# Patient Record
Sex: FEMALE | Race: WHITE | Employment: FULL TIME | URBAN - METROPOLITAN AREA
[De-identification: names, ages, dates, MRNs, and addresses within clinical notes are randomized per-mention and may not be internally consistent; named-entity substitution may affect disease eponyms.]

---

## 2017-07-13 ENCOUNTER — OFFICE VISIT (OUTPATIENT)
Dept: FAMILY MEDICINE | Facility: CLINIC | Age: 27
End: 2017-07-13
Payer: COMMERCIAL

## 2017-07-13 VITALS
HEIGHT: 64 IN | RESPIRATION RATE: 16 BRPM | DIASTOLIC BLOOD PRESSURE: 66 MMHG | SYSTOLIC BLOOD PRESSURE: 98 MMHG | HEART RATE: 84 BPM | WEIGHT: 116 LBS | BODY MASS INDEX: 19.81 KG/M2 | TEMPERATURE: 98.5 F | OXYGEN SATURATION: 99 %

## 2017-07-13 DIAGNOSIS — N76.0 BACTERIAL VAGINOSIS: Primary | ICD-10-CM

## 2017-07-13 DIAGNOSIS — B96.89 BACTERIAL VAGINOSIS: Primary | ICD-10-CM

## 2017-07-13 DIAGNOSIS — N89.8 VAGINAL DISCHARGE: ICD-10-CM

## 2017-07-13 DIAGNOSIS — B37.31 VAGINAL YEAST INFECTION: ICD-10-CM

## 2017-07-13 LAB
MICRO REPORT STATUS: ABNORMAL
SPECIMEN SOURCE: ABNORMAL
WET PREP SPEC: ABNORMAL

## 2017-07-13 PROCEDURE — 99203 OFFICE O/P NEW LOW 30 MIN: CPT | Performed by: PHYSICIAN ASSISTANT

## 2017-07-13 PROCEDURE — 87210 SMEAR WET MOUNT SALINE/INK: CPT | Performed by: PHYSICIAN ASSISTANT

## 2017-07-13 RX ORDER — METRONIDAZOLE 500 MG/1
500 TABLET ORAL 2 TIMES DAILY
Qty: 14 TABLET | Refills: 0 | Status: SHIPPED | OUTPATIENT
Start: 2017-07-13 | End: 2018-01-22

## 2017-07-13 RX ORDER — FLUCONAZOLE 150 MG/1
150 TABLET ORAL ONCE
Qty: 1 TABLET | Refills: 1 | Status: SHIPPED | OUTPATIENT
Start: 2017-07-13 | End: 2017-07-13

## 2017-07-13 NOTE — MR AVS SNAPSHOT
"              After Visit Summary   2017    Rachel Rhoades    MRN: [id number]           Patient Information     Date Of Birth          1990        Visit Information        Provider Department      2017 9:50 AM Siegler, Nicole Joy, PA-C [facility]        Today's Diagnoses     Bacterial vaginosis    -  1    Vaginal yeast infection        Vaginal discharge           Follow-ups after your visit        Who to contact     If you have questions or need follow up information about today's clinic visit or your schedule please contact [facility] directly at [phone].  Normal or non-critical lab and imaging results will be communicated to you by Kidamomhart, letter or phone within 4 business days after the clinic has received the results. If you do not hear from us within 7 days, please contact the clinic through Kidamomhart or phone. If you have a critical or abnormal lab result, we will notify you by phone as soon as possible.  Submit refill requests through Simphatic or call your pharmacy and they will forward the refill request to us. Please allow 3 business days for your refill to be completed.          Additional Information About Your Visit        MyChart Information     Simphatic lets you send messages to your doctor, view your test results, renew your prescriptions, schedule appointments and more. To sign up, go to [email or web address] . Click on \"Log in\" on the left side of the screen, which will take you to the Welcome page. Then click on \"Sign up Now\" on the right side of the page.     You will be asked to enter the access code listed below, as well as some personal information. Please follow the directions to create your username and password.     Your access code is: [email or web address]  Expires: 10/11/2017 10:31 AM     Your access code will  in 90 days. If you need help or a new code, please call your [facility] or [phone].      " "  Care EveryWhere ID     This is your Care EveryWhere ID. This could be used by other organizations to access your [place] medical records  [id number]        Your Vitals Were     Pulse Temperature Respirations Height Last Period Pulse Oximetry    84 98.5  F (36.9  C) (Tympanic) 16 5' 4\" (1.626 m) 06/24/2017 99%    Breastfeeding? BMI (Body Mass Index)                No 19.91 kg/m2           Blood Pressure from Last 3 Encounters:   07/13/17 98/66   08/03/15 111/87   03/19/14 106/68    Weight from Last 3 Encounters:   07/13/17 116 lb (52.6 kg)   08/03/15 121 lb 4.1 oz (55 kg)   03/19/14 119 lb 0.8 oz (54 kg)              We Performed the Following     Wet prep          Today's Medication Changes          These changes are accurate as of: 7/13/17 10:31 AM.  If you have any questions, ask your nurse or doctor.               Start taking these medicines.        Dose/Directions    fluconazole 150 MG tablet   Commonly known as:  DIFLUCAN   Used for:  Vaginal yeast infection   Started by:  Siegler, Nicole Joy, PA-C        Dose:  150 mg   Take 1 tablet (150 mg) by mouth once for 1 dose Refill after 3 days if not improved   Quantity:  1 tablet   Refills:  1       metroNIDAZOLE 500 MG tablet   Commonly known as:  FLAGYL   Used for:  Bacterial vaginosis   Started by:  Siegler, Nicole Joy, PA-C        Dose:  500 mg   Take 1 tablet (500 mg) by mouth 2 times daily   Quantity:  14 tablet   Refills:  0            Where to get your medicines      These medications were sent to [facility] [address] [place] [address] [address]  [address] [facility] [place] [address]     Phone:  [phone]     fluconazole 150 MG tablet    metroNIDAZOLE 500 MG tablet                Primary Care Provider    None       No address on file        Equal Access to Services     BRIAN LEDESMA AH: Eveline vergara Sonaomi, waaxda luqadaha, qaybta kaalmada adeegyada, silviano wolf. So [facility] " [phone].    ATENCIÓN: Si chang santos, tiene a yañez disposición servicios gratuitos de asistencia lingüística. Jodi medrano [phone].    We comply with applicable federal civil rights laws and [place] laws. We do not discriminate on the basis of race, color, national origin, age, disability sex, sexual orientation or gender identity.            Thank you!     Thank you for choosing [facility]  for your care. Our goal is always to provide you with excellent care. Hearing back from our patients is one way we can continue to improve our services. Please take a few minutes to complete the written survey that you may receive in the mail after your visit with us. Thank you!             Your Updated Medication List - Protect others around you: Learn how to safely use, store and throw away your medicines at www.disposemymeds.org.          This list is accurate as of: 7/13/17 10:31 AM.  Always use your most recent med list.                   Brand Name Dispense Instructions for use Diagnosis    albuterol 108 (90 BASE) MCG/ACT Inhaler    PROAIR HFA/PROVENTIL HFA/VENTOLIN HFA    1 Inhaler    Inhale 2 puffs into the lungs every 4 hours as needed for shortness of breath / dyspnea. May use 2 puffs 10 minutes before exercise.    Mild persistent asthma       fluconazole 150 MG tablet    DIFLUCAN    1 tablet    Take 1 tablet (150 mg) by mouth once for 1 dose Refill after 3 days if not improved    Vaginal yeast infection       metroNIDAZOLE 500 MG tablet    FLAGYL    14 tablet    Take 1 tablet (500 mg) by mouth 2 times daily    Bacterial vaginosis       norgestimate-ethinyl estradiol 0.25-35 MG-MCG per tablet    ORTHO-CYCLEN (28)    3 Package    Take 1 tablet by mouth daily

## 2017-07-13 NOTE — NURSING NOTE
"Chief Complaint   Patient presents with     Vaginal Problem     Itching, burning, odor, painful intercourse       Initial BP 98/66 (BP Location: Left arm, Patient Position: Sitting, Cuff Size: Adult Regular)  Pulse 84  Temp 98.5  F (36.9  C) (Tympanic)  Resp 16  Ht 5' 4\" (1.626 m)  Wt 116 lb (52.6 kg)  LMP 06/24/2017  SpO2 99%  Breastfeeding? No  BMI 19.91 kg/m2 Estimated body mass index is 19.91 kg/(m^2) as calculated from the following:    Height as of this encounter: 5' 4\" (1.626 m).    Weight as of this encounter: 116 lb (52.6 kg).  Medication Reconciliation: complete     Mindy Orozco LPN  "

## 2017-07-13 NOTE — PROGRESS NOTES
SUBJECTIVE:                                                    Rachel Rhoades is a 26 year old female who presents to clinic today for the following health issues:    Vaginal Symptoms      Duration: Started on Sat.    Description  vaginal discharge - white, itching, burning, odor and pain with intercourse    Intensity:  moderate    Accompanying signs and symptoms (fever/dysuria/abdominal or back pain): None    History  Sexually active: yes, single partner, contraception - oral contraceptives (combined)  Possibility of pregnancy: No  Recent antibiotic use: no     Precipitating or alleviating factors: None    Therapies tried and outcome: none   Outcome:       Pt notes pain/irritation of the vaginal canal with sex a few days ago and symptoms above over the past few days. She was recently on the [facility] and was showering a bit less, she had one bath last week. She has hx of vaginal yeast infection with similar symptoms. She is sexually active with one male partner, they are monogamous. She has not had exposure to STD recently.     Problem list and histories reviewed & adjusted, as indicated.  Additional history: as documented    Patient Active Problem List   Diagnosis     Heart valve problem     Ovarian cystic mass     Mild intermittent asthma     Anomalous atrioventricular excitation     Past Surgical History:   Procedure Laterality Date     C ABLATION SUPRAVENT ARRHYTHMOGENIC FOCUS  8/2007    radiofrequency cardiac ablation for WPW       Social History   Substance Use Topics     Smoking status: Never Smoker     Smokeless tobacco: Never Used     Alcohol use No     Family History   Problem Relation Age of Onset     Hypertension Mother      Asthma Sister      sports induced     Hypertension Maternal Aunt      Hypertension Maternal Uncle      CEREBROVASCULAR DISEASE Maternal Grandfather      Breast Cancer Paternal Grandmother      HEART DISEASE Paternal Grandmother      Allergies Sister      seasonal         Current  "Outpatient Prescriptions   Medication Sig Dispense Refill     metroNIDAZOLE (FLAGYL) 500 MG tablet Take 1 tablet (500 mg) by mouth 2 times daily 14 tablet 0     fluconazole (DIFLUCAN) 150 MG tablet Take 1 tablet (150 mg) by mouth once for 1 dose Refill after 3 days if not improved 1 tablet 1     norgestimate-ethinyl estradiol (ORTHO-CYCLEN, 28,) 0.25-35 MG-MCG tablet Take 1 tablet by mouth daily 3 Package 1     albuterol (PROAIR HFA, PROVENTIL HFA, VENTOLIN HFA) 108 (90 BASE) MCG/ACT inhaler Inhale 2 puffs into the lungs every 4 hours as needed for shortness of breath / dyspnea. May use 2 puffs 10 minutes before exercise. 1 Inhaler 5       Reviewed and updated as needed this visit by clinical staff  Tobacco  Allergies  Meds  Med Hx  Surg Hx  Fam Hx  Soc Hx      Reviewed and updated as needed this visit by Provider         ROS:  Patient denies fever, chills, nausea, vomiting, diarrhea, abdominal pain, dysuria, hematuria, pelvic pain, flank pain, headache, dizziness, lightheadedness.      OBJECTIVE:     BP 98/66 (BP Location: Left arm, Patient Position: Sitting, Cuff Size: Adult Regular)  Pulse 84  Temp 98.5  F (36.9  C) (Tympanic)  Resp 16  Ht 5' 4\" (1.626 m)  Wt 116 lb (52.6 kg)  LMP 06/24/2017  SpO2 99%  Breastfeeding? No  BMI 19.91 kg/m2  Body mass index is 19.91 kg/(m^2).  GENERAL: healthy, alert and no distress   (female): normal female external genitalia, normal urethral meatus, vaginal mucosa is erythematous, normal cervix/adnexa/uterus without masses. White thick discharge.   SKIN: no suspicious lesions or rashes, no ecchymosis or redness  BACK: no CVA tenderness, no paralumbar tenderness    Diagnostic Test Results:  Results for orders placed or performed in visit on 07/13/17 (from the past 24 hour(s))   Wet prep   Result Value Ref Range    Specimen Description Vagina     Wet Prep Clue cells seen  Yeast seen  No Trichomonas seen   (A)     Micro Report Status FINAL 07/13/2017  "       ASSESSMENT/PLAN:         ICD-10-CM    1. Bacterial vaginosis N76.0 metroNIDAZOLE (FLAGYL) 500 MG tablet    B96.89    2. Vaginal yeast infection B37.3 fluconazole (DIFLUCAN) 150 MG tablet   3. Vaginal discharge N89.8 Wet prep     Discussed home care with the patient; she is familiar with both issues and has been treated for both in the past. She was recommended to use the metronidazole first and then use the diflucan to reduce potential for recurrent vaginal yeast infection. We discussed potential side effects of medication use and noted specifically no alcohol with flagyl. No intercourse or baths for duration of therapy and monitor for worsening symptoms.     Nicole Joy Siegler, PA-C  [facility]

## 2018-01-22 ENCOUNTER — OFFICE VISIT (OUTPATIENT)
Dept: FAMILY MEDICINE | Facility: CLINIC | Age: 28
End: 2018-01-22
Payer: COMMERCIAL

## 2018-01-22 ENCOUNTER — TELEPHONE (OUTPATIENT)
Dept: FAMILY MEDICINE | Facility: CLINIC | Age: 28
End: 2018-01-22

## 2018-01-22 VITALS
SYSTOLIC BLOOD PRESSURE: 129 MMHG | WEIGHT: 118.3 LBS | HEIGHT: 64 IN | DIASTOLIC BLOOD PRESSURE: 87 MMHG | HEART RATE: 144 BPM | BODY MASS INDEX: 20.2 KG/M2 | OXYGEN SATURATION: 96 % | TEMPERATURE: 101.6 F

## 2018-01-22 DIAGNOSIS — J11.1 FLU SYNDROME: Primary | ICD-10-CM

## 2018-01-22 DIAGNOSIS — J45.21 MILD INTERMITTENT ASTHMA WITH EXACERBATION: ICD-10-CM

## 2018-01-22 LAB
FLUAV+FLUBV AG SPEC QL: NEGATIVE
FLUAV+FLUBV AG SPEC QL: POSITIVE
SPECIMEN SOURCE: ABNORMAL

## 2018-01-22 PROCEDURE — 87804 INFLUENZA ASSAY W/OPTIC: CPT | Performed by: FAMILY MEDICINE

## 2018-01-22 PROCEDURE — 99214 OFFICE O/P EST MOD 30 MIN: CPT | Performed by: FAMILY MEDICINE

## 2018-01-22 RX ORDER — OSELTAMIVIR PHOSPHATE 75 MG/1
75 CAPSULE ORAL 2 TIMES DAILY
Qty: 10 CAPSULE | Refills: 0 | Status: SHIPPED | OUTPATIENT
Start: 2018-01-22 | End: 2018-03-01

## 2018-01-22 RX ORDER — PREDNISONE 20 MG/1
20 TABLET ORAL DAILY
Qty: 5 TABLET | Refills: 0 | Status: SHIPPED | OUTPATIENT
Start: 2018-01-22 | End: 2018-01-27

## 2018-01-22 NOTE — MR AVS SNAPSHOT
"              After Visit Summary   1/22/2018    Rachel Rhoades    MRN: [id number]           Patient Information     Date Of Birth          1990        Visit Information        Provider Department      1/22/2018 12:30 PM Radha Celestin MD [facility]        Today's Diagnoses     Flu syndrome    -  1    Mild intermittent asthma with exacerbation          Care Instructions    Since within the window 48-72 hours, may benefit from  Antiviral    oseltamivir (TAMIFLU) 75      MG capsule twice daily for 5 days  Stay hydrated, rest as needed   Avoid travel- had flight to [place] tomorrow- supporting letter to cancel the trip is given  Avoid dehydration  Follow up as needed              Asthma, mild intermittent  Plan: albuterol four times daily as needed    If more wheezing- prednisone may help 20 mg once daily  Meanwhile keep taking albuterol inhaler four times daily as needed            Follow-ups after your visit        Who to contact     If you have questions or need follow up information about today's clinic visit or your schedule please contact [facility] directly at [phone].  Normal or non-critical lab and imaging results will be communicated to you by Playnomicshart, letter or phone within 4 business days after the clinic has received the results. If you do not hear from us within 7 days, please contact the clinic through [employer] or phone. If you have a critical or abnormal lab result, we will notify you by phone as soon as possible.  Submit refill requests through [employer] or call your pharmacy and they will forward the refill request to us. Please allow 3 business days for your refill to be completed.          Additional Information About Your Visit        Playnomicshart Information     [employer] lets you send messages to your doctor, view your test results, renew your prescriptions, schedule appointments and more. To sign up, go to [email or web address] . Click on \"Log in\" on the left side of " "the screen, which will take you to the Welcome page. Then click on \"Sign up Now\" on the right side of the page.     You will be asked to enter the access code listed below, as well as some personal information. Please follow the directions to create your username and password.     Your access code is: [email or web address]  Expires: 2018  1:24 PM     Your access code will  in 90 days. If you need help or a new code, please call your [facility] or [phone].        Care EveryWhere ID     This is your Care EveryWhere ID. This could be used by other organizations to access your [place] medical records  [id number]        Your Vitals Were     Pulse Temperature Height Last Period Pulse Oximetry Breastfeeding?    144 101.6  F (38.7  C) (Tympanic) 5' 4\" (1.626 m) 2018 (Approximate) 96% No    BMI (Body Mass Index)                   20.31 kg/m2            Blood Pressure from Last 3 Encounters:   18 129/87   17 98/66   08/03/15 111/87    Weight from Last 3 Encounters:   18 118 lb 4.8 oz (53.7 kg)   17 116 lb (52.6 kg)   08/03/15 121 lb 4.1 oz (55 kg)              We Performed the Following     Influenza A/B antigen          Today's Medication Changes          These changes are accurate as of: 18  1:24 PM.  If you have any questions, ask your nurse or doctor.               Start taking these medicines.        Dose/Directions    oseltamivir 75 MG capsule   Commonly known as:  TAMIFLU   Used for:  Flu syndrome   Started by:  Radha Celestin MD        Dose:  75 mg   Take 1 capsule (75 mg) by mouth 2 times daily   Quantity:  10 capsule   Refills:  0       predniSONE 20 MG tablet   Commonly known as:  DELTASONE   Used for:  Mild intermittent asthma with exacerbation   Started by:  Radha Celestin MD        Dose:  20 mg   Take 1 tablet (20 mg) by mouth daily for 5 days   Quantity:  5 tablet   Refills:  0            Where to get your medicines      These medications were sent to " [facility] - [place], [place] - [address] AT [facility] [address]  [address], [facility] [address]     Phone:  [phone]     oseltamivir 75 MG capsule    predniSONE 20 MG tablet                Primary Care Provider    None Specified       No primary provider on file.        Equal Access to Services     BRIAN LEDESMA : Hadii aad ku hadasho Soomaali, waaxda luqadaha, qaybta kaalmada adeegyada, waxay idiin hayaan adeeg khjose luisjameson holland . So [facility] [phone].    ATENCIÓN: Si habla español, tiene a yañez disposición servicios gratuitos de asistencia lingüística. [facility] [phone].    We comply with applicable federal civil rights laws and [place] laws. We do not discriminate on the basis of race, color, national origin, age, disability, sex, sexual orientation, or gender identity.            Thank you!     Thank you for choosing [facility]  for your care. Our goal is always to provide you with excellent care. Hearing back from our patients is one way we can continue to improve our services. Please take a few minutes to complete the written survey that you may receive in the mail after your visit with us. Thank you!             Your Updated Medication List - Protect others around you: Learn how to safely use, store and throw away your medicines at www.disposemymeds.org.          This list is accurate as of: 1/22/18  1:24 PM.  Always use your most recent med list.                   Brand Name Dispense Instructions for use Diagnosis    albuterol 108 (90 BASE) MCG/ACT Inhaler    PROAIR HFA/PROVENTIL HFA/VENTOLIN HFA    1 Inhaler    Inhale 2 puffs into the lungs every 4 hours as needed for shortness of breath / dyspnea. May use 2 puffs 10 minutes before exercise.    Mild persistent asthma       norgestimate-ethinyl estradiol 0.25-35 MG-MCG per tablet    ORTHO-CYCLEN (28)    3 Package    Take 1 tablet by mouth daily        oseltamivir 75 MG capsule    TAMIFLU    10 capsule    Take  1 capsule (75 mg) by mouth 2 times daily    Flu syndrome       predniSONE 20 MG tablet    DELTASONE    5 tablet    Take 1 tablet (20 mg) by mouth daily for 5 days    Mild intermittent asthma with exacerbation

## 2018-01-22 NOTE — TELEPHONE ENCOUNTER
Reason for call:  Patient reporting a symptom    Symptom or request: Chills, chest congestion, raspy voice, Feels warm, body aches, headache, pressure in chest    Duration (how long have symptoms been present): 2 days    Have you been treated for this before? No    Additional comments: Pt does not have a PCP    Phone Number patient can be reached at:  Home number on file [phone] (home)    Best Time:  anytime    Can we leave a detailed message on this number:  YES    Call taken on 1/22/2018 at 10:30 AM by Leela Herrera

## 2018-01-22 NOTE — PROGRESS NOTES
SUBJECTIVE:   Rachel Rhoades is a 27 year old female who presents to clinic today for the following health issues:  Reports sever onset of bodyaches, fever, coughing within past 2 days  History of asthma- and nw wheeizng some  Cough is dry, some [place], [place].  No flu vaccine this year  Appetite is decreased   Trying to stay hydrated  No nausea,vomiting,diarrhea   Has to fly tomorrow for work  RESPIRATORY SYMPTOMS      Duration: 2 days    Description  Chest congestion, chills, fatigue/malaise and hoarse voice    Severity: moderate to severe     Accompanying signs and symptoms: dry cough,chest pressure and pt states she feels very hot to touch- per boyfriend and some wheezing at times.    History (predisposing factors):  asthma    Therapies tried and outcome:  Robitussin DM chest congestion -with some relief        PROBLEMS TO ADD ON...    Problem list and histories reviewed & adjusted, as indicated.  Additional history: as documented    Patient Active Problem List   Diagnosis     Heart valve problem     Ovarian cystic mass     Mild intermittent asthma     Anomalous atrioventricular excitation     Past Surgical History:   Procedure Laterality Date     C ABLATION SUPRAVENT ARRHYTHMOGENIC FOCUS  8/2007    radiofrequency cardiac ablation for WPW       Social History   Substance Use Topics     Smoking status: Never Smoker     Smokeless tobacco: Never Used     Alcohol use No     Family History   Problem Relation Age of Onset     Hypertension Mother      Asthma Sister      sports induced     Hypertension Maternal Aunt      Hypertension Maternal Uncle      CEREBROVASCULAR DISEASE Maternal Grandfather      Breast Cancer Paternal Grandmother      HEART DISEASE Paternal Grandmother      Allergies Sister      seasonal             Reviewed and updated as needed this visit by clinical staff     Reviewed and updated as needed this visit by Provider         ROS:  CONSTITUTIONAL:as in History of presenting illness   I: NEGATIVE  "for worrisome rashes, moles or lesions  E: NEGATIVE for vision changes or irritation  ENT/MOUTH: as in History of presenting illness   RESP:coughing dry  CV: NEGATIVE for chest pain, palpitations or peripheral edema  GI: NEGATIVE for nausea, abdominal pain, heartburn, or change in bowel habits  MUSCULOSKELETAL:generalzied boyaches  N: NEGATIVE for weakness, dizziness or paresthesias  E: NEGATIVE for temperature intolerance, skin/hair changes  H: NEGATIVE for bleeding problems  P: NEGATIVE for changes in mood or affect    OBJECTIVE:     /87  Pulse 144  Temp 101.6  F (38.7  C) (Tympanic)  Ht 5' 4\" (1.626 m)  Wt 118 lb 4.8 oz (53.7 kg)  LMP 01/16/2018 (Approximate)  SpO2 96%  Breastfeeding? No  BMI 20.31 kg/m2  Body mass index is 20.31 kg/(m^2).  GENERAL: healthy, alert and no distress  HENT: ear canals and TM's normal, nose and mouth without ulcers or lesions  NECK: no adenopathy, no asymmetry, masses, or scars and thyroid normal to palpation  RESP: lungs clear to auscultation - no rales, rhonchi or wheezes  CV: regular rate and rhythm, normal S1 S2, no S3 or S4, no murmur, click or rub, no peripheral edema and peripheral pulses strong  ABDOMEN: soft, nontender, no hepatosplenomegaly, no masses and bowel sounds normal  NEURO: Normal strength and tone, mentation intact and speech normal    Diagnostic Test Results:  Results for orders placed or performed in visit on 01/22/18 (from the past 24 hour(s))   Influenza A/B antigen   Result Value Ref Range    Influenza A/B Agn Specimen Nasal     Influenza A Negative NEG^Negative    Influenza B Positive (A) NEG^Negative       ASSESSMENT/PLAN:     (J11.1) Flu syndrome  (primary encounter diagnosis)  Plan: Influenza A/B antigen,pos for B  Since within the window 48-72 hours, may benefit from  Antiviral    oseltamivir (TAMIFLU) 75      MG capsule twice daily for 5 days  Stay hydrated, rest as needed   Avoid travel- had flight to [place] tomorrow- supporting letter to " cancel the trip is given  Avoid dehydration  Follow up as needed              Asthma, mild intermittent  Plan: albuterol four times daily as needed    If more wheezing- prednisone may help 20 mg once daily  Meanwhile keep taking albuterol inhaler four times daily as needed            Potential medication side effects were discussed with the patient; let me know if any occur.  The patient indicates understanding of these issues and agrees with the plan.            Radha Celestin MD  [facility]

## 2018-01-22 NOTE — PATIENT INSTRUCTIONS
Since within the window 48-72 hours, may benefit from  Antiviral    oseltamivir (TAMIFLU) 75      MG capsule twice daily for 5 days  Stay hydrated, rest as needed   Avoid travel- had flight to [place] tomorrow- supporting letter to cancel the trip is given  Avoid dehydration  Follow up as needed              Asthma, mild intermittent  Plan: albuterol four times daily as needed    If more wheezing- prednisone may help 20 mg once daily  Meanwhile keep taking albuterol inhaler four times daily as needed

## 2018-01-22 NOTE — TELEPHONE ENCOUNTER
Patient not seen here at [facility] before  Called to see if she should come in or do care at home for flu like symptoms  For past two days has had chills, chest congestion, raspy voice, body aches, headache, and chest pressure  States she feels warm - boyfriend felt her forehead and it was very hot she said  Patient does not have thermometer at home though  Has a dry cough  Taking Robitussin DM Chest Congestion - this helps some  States she has wheezing at times with her breathing - asthma on problem list  Has not had to use Albuterol yet for breathing  Patient has heart issues on chart as well  She does not sound good on the phone - sounds very sick   Advised appt - asked that she put a mask on as soon as she comes in    Next 5 appointments (look out 90 days)     Jan 22, 2018 12:30 PM CST   Office Visit with Radha Celestin MD   [facility] ([facility])    [address]  [facility] [id number] [phone]                Jo Ann SERVIN RN

## 2018-01-22 NOTE — LETTER
Regarding: Travel tomorrow by air    For Rachel Rhoades   1990      Rachel Rhoades is sen in the clinic today and is advised to avoid travel tomorrow   Kindly assist her in flight cancellations accordingly    Thank you    Radha Celestin ....................  2018   1:22 PM

## 2018-03-01 ENCOUNTER — OFFICE VISIT (OUTPATIENT)
Dept: FAMILY MEDICINE | Facility: CLINIC | Age: 28
End: 2018-03-01
Payer: COMMERCIAL

## 2018-03-01 VITALS
WEIGHT: 121 LBS | BODY MASS INDEX: 20.66 KG/M2 | TEMPERATURE: 99.2 F | HEIGHT: 64 IN | RESPIRATION RATE: 16 BRPM | DIASTOLIC BLOOD PRESSURE: 72 MMHG | SYSTOLIC BLOOD PRESSURE: 108 MMHG | OXYGEN SATURATION: 99 % | HEART RATE: 85 BPM

## 2018-03-01 DIAGNOSIS — B96.89 BACTERIAL VAGINAL INFECTION: Primary | ICD-10-CM

## 2018-03-01 DIAGNOSIS — Z12.4 CERVICAL CANCER SCREENING: ICD-10-CM

## 2018-03-01 DIAGNOSIS — Z30.41 ENCOUNTER FOR SURVEILLANCE OF CONTRACEPTIVE PILLS: ICD-10-CM

## 2018-03-01 DIAGNOSIS — N89.8 VAGINAL DISCHARGE: ICD-10-CM

## 2018-03-01 DIAGNOSIS — B37.31 YEAST INFECTION OF THE VAGINA: ICD-10-CM

## 2018-03-01 DIAGNOSIS — N76.0 BACTERIAL VAGINAL INFECTION: Primary | ICD-10-CM

## 2018-03-01 DIAGNOSIS — J45.30 MILD PERSISTENT ASTHMA, UNSPECIFIED WHETHER COMPLICATED: ICD-10-CM

## 2018-03-01 LAB
Lab: ABNORMAL
SPECIMEN SOURCE: ABNORMAL
WET PREP SPEC: ABNORMAL

## 2018-03-01 PROCEDURE — G0145 SCR C/V CYTO,THINLAYER,RESCR: HCPCS | Performed by: FAMILY MEDICINE

## 2018-03-01 PROCEDURE — 99214 OFFICE O/P EST MOD 30 MIN: CPT | Performed by: FAMILY MEDICINE

## 2018-03-01 PROCEDURE — 87210 SMEAR WET MOUNT SALINE/INK: CPT | Performed by: FAMILY MEDICINE

## 2018-03-01 PROCEDURE — 87491 CHLMYD TRACH DNA AMP PROBE: CPT | Performed by: FAMILY MEDICINE

## 2018-03-01 PROCEDURE — 87591 N.GONORRHOEAE DNA AMP PROB: CPT | Performed by: FAMILY MEDICINE

## 2018-03-01 RX ORDER — METRONIDAZOLE 500 MG/1
500 TABLET ORAL 2 TIMES DAILY
Qty: 14 TABLET | Refills: 0 | Status: SHIPPED | OUTPATIENT
Start: 2018-03-01 | End: 2018-06-18

## 2018-03-01 RX ORDER — FLUCONAZOLE 150 MG/1
TABLET ORAL
Qty: 4 TABLET | Refills: 0 | Status: SHIPPED | OUTPATIENT
Start: 2018-03-01 | End: 2018-06-18

## 2018-03-01 RX ORDER — ALBUTEROL SULFATE 90 UG/1
2 AEROSOL, METERED RESPIRATORY (INHALATION) EVERY 4 HOURS PRN
Qty: 1 INHALER | Refills: 5 | Status: SHIPPED | OUTPATIENT
Start: 2018-03-01 | End: 2019-03-26

## 2018-03-01 RX ORDER — NORGESTIMATE AND ETHINYL ESTRADIOL 0.25-0.035
1 KIT ORAL DAILY
Qty: 28 TABLET | Refills: 3 | Status: SHIPPED | OUTPATIENT
Start: 2018-03-01 | End: 2019-03-27

## 2018-03-01 ASSESSMENT — PAIN SCALES - GENERAL: PAINLEVEL: NO PAIN (0)

## 2018-03-01 NOTE — MR AVS SNAPSHOT
After Visit Summary   3/1/2018    Rachel Rhoades    MRN: [id number]           Patient Information     Date Of Birth          1990        Visit Information        Provider Department      3/1/2018 11:30 AM Suly Montalvo DO [facility]        Today's Diagnoses     Vaginal discharge    -  1    Cervical cancer screening          Care Instructions      Preventing Vaginitis     Use mild, unscented soap when you bathe or shower to avoid irritating your vagina.    Vaginitis is irritation or infection of the vagina or vulva (the outside opening of the vagina). Vaginitis can be caused by bacteria, viruses, parasites, or yeast. Chemicals (such as in perfumes or soaps or in spermicides) can sometimes be a cause. Vaginitis can be caused by hormone changes in pregnancy or with menopause. You can help prevent vaginitis. Follow the tips below. And see your healthcare provider if you have any symptoms.  Hygiene    Avoid chemicals. Do not use vaginal sprays. Do not use scented toilet paper or tampons that are scented. Sprays and scents have chemicals that can irritate your vagina.    Do not douche unless you are told to by your healthcare provider. Douching is rarely needed. And it upsets the normal balance in the vagina.    Wash yourself well. Wash the outer vaginal area (vulva) every day with mild, unscented soap. Keep it as dry as possible.    Wipe correctly. Make sure to wipe from front to back after a bowel movement. This helps keep from spreading bacteria from your anus to your vagina.    Change your tampon often. During your period, make sure to change your tampon as often as directed on the package. This allows the normal flow of vaginal discharge and blood.  Lifestyle    Limit your number of sexual partners. The more partners you have, the greater your risk of infection. Using condoms helps reduce your risk.    Get enough sleep. Sleep helps keep your body s immune  system healthy. This helps you fight infection.    Lose weight, if needed. Excess weight can reduce air circulation around your vagina. This can increase your risk of infection.    Exercise regularly. Regular activity helps keep your body healthy.    Take antibiotics only as directed. Antibiotics can change the normal chemical balance in the vagina.    Clothing    Don t sit in wet clothes. Yeast thrives when it s warm and damp.    Don t wear tight pants. And don t wear tights, leggings, or hose without a cotton crotch. These types of clothing trap warmth and moisture.    Wear cotton underwear. Cotton lets air circulate around the vagina.  Symptoms of vaginitis    Irritation, swelling, or itching of the genital area    Vaginal discharge    Bad vaginal odor    Pain or burning during urination   Date Last Reviewed: 12/1/2016 2000-2017 [employer]. [address] [place] [address]. All rights reserved. This information is not intended as a substitute for professional medical care. Always follow your healthcare professional's instructions.          Vaginal Infection: Bacterial Vaginosis  Both good and bad bacteria are present in a healthy vagina. Bacterial vaginosis (BV) occurs when these bacteria get out of balance. The numbers of good bacteria decrease. This allows the numbers of bad bacteria to increase and cause BV. In most cases, BV is not a serious problem. This sheet tells you more about BV and how it can be treated.  Causes of Bacterial Vaginosis  The cause of BV is not clear. Douching may lead to it. Having sex with a new partner or more than 1 partner makes it more likely.  Symptoms of Bacterial Vaginosis  Symptoms of BV vary for each woman. Some women have few symptoms or none at all. If symptoms are present, they can include:    Thin, milky white or gray discharge    Unpleasant  fishy  odor    Irritation, itching, and burning at opening of vagina.    Burning or irritation with sex or  urination  Diagnosing Bacterial Vaginosis  Your health care provider will ask about your symptoms and health history. He or she will also perform a pelvic exam. This is an exam of your vagina and cervix. A sample of vaginal fluid or discharge may be taken. This sample is checked for signs of BV.  Treating Bacterial Vaginosis  BV is often treated with antibiotics. They may be given in oral pill form or as a vaginal cream. To use these medications:    Be sure to take all of your medication, even if your symptoms go away.    If you re taking antibiotic pills, do not drink alcohol until you re finished with all of your medication.    If you re using vaginal cream, apply it as directed. Be aware that the cream may make condoms and diaphragms less effective.    Call your health care provider if symptoms do not go away within 4 days of starting treatment. Also call if you have a reaction to the medication.  Why Treatment Matters  Even if you have no symptoms or your symptoms are mild, BV should be treated. Untreated BV can lead to health problems such as:    Increased risk of  delivery if you re pregnant.    Increased risk of complications after surgery on the reproductive organs.    Possible increased risk of pelvic inflammatory disease (PID).     6748-7155 [employer]. [address] [place] [address]. All rights reserved. This information is not intended as a substitute for professional medical care. Always follow your healthcare professional's instructions.  This information has been modified by your health care provider with permission from the publisher.        Vaginal Infection: Yeast (Candidiasis)  Yeast infection occurs when yeast in the vagina increase and attacks the vaginal tissues. Yeast is a type of fungus. These infections are often caused by a type of yeast called Candida albicans. Other species of yeast can also cause infections. Factors that may make infection more likely  include recent antibiotic use, douching, or increased sex. Yeast infections are more common in women who have diabetes, or are obese or pregnant, or have a weak immune system.  Symptoms of yeast infection    Clumpy or thin, white discharge, which may look like cottage cheese    No odor or minimal odor    Severe vaginal itching or burning    Burning with urination    Swelling, redness of vulva    Pain during sex  Treating yeast infection  Yeast infection is treated with a vaginal antifungal cream. In some cases, antifungal pills are prescribed instead. During treatment:    Finish all of your medicine, even if your symptoms go away.    Apply the cream before going to bed. Lie flat after applying so that it doesn't drip out.    Do not douche or use tampons.    Don't rely on a diaphragm or condoms, since the cream may weaken them.    Avoid intercourse if advised by your healthcare provider.     Should I treat a yeast infection myself?  Discuss with your healthcare provider whether you should use over-the-counter medicines to treat a yeast infection. Self-treatment may depend on whether:    You've had a yeast infection in the past.    You're at risk for STDs.  Call your healthcare provider if symptoms do not go away or come back after treatment.   Date Last Reviewed: 3/1/2017    7542-0599 [employer]. [address] [place] [address], [place] [address]. All rights reserved. This information is not intended as a substitute for professional medical care. Always follow your healthcare professional's instructions.                Follow-ups after your visit        Follow-up notes from your care team     Return if symptoms worsen or fail to improve.      Who to contact     If you have questions or need follow up information about today's clinic visit or your schedule please contact [facility] directly at [phone].  Normal or non-critical lab and imaging results will be communicated to you  "by [employer], letter or phone within 4 business days after the clinic has received the results. If you do not hear from us within 7 days, please contact the clinic through [employer] or phone. If you have a critical or abnormal lab result, we will notify you by phone as soon as possible.  Submit refill requests through [employer] or call your pharmacy and they will forward the refill request to us. Please allow 3 business days for your refill to be completed.          Additional Information About Your Visit        [facility] Information     [employer] lets you send messages to your doctor, view your test results, renew your prescriptions, schedule appointments and more. To sign up, go to [email or web address] . Click on \"Log in\" on the left side of the screen, which will take you to the Welcome page. Then click on \"Sign up Now\" on the right side of the page.     You will be asked to enter the access code listed below, as well as some personal information. Please follow the directions to create your username and password.     Your access code is: [email or web address]  Expires: 2018  1:24 PM     Your access code will  in 90 days. If you need help or a new code, please call your [place] clinic or [phone].        Care EveryWhere ID     This is your Care EveryWhere ID. This could be used by other organizations to access your [facility] medical records  [id number]        Your Vitals Were     Pulse Temperature Respirations Height Last Period Pulse Oximetry    85 99.2  F (37.3  C) (Tympanic) 16 5' 4\" (1.626 m) 2018 (Approximate) 99%    Breastfeeding? BMI (Body Mass Index)                No 20.77 kg/m2           Blood Pressure from Last 3 Encounters:   18 108/72   18 129/87   17 98/66    Weight from Last 3 Encounters:   18 121 lb (54.9 kg)   18 118 lb 4.8 oz (53.7 kg)   17 116 lb (52.6 kg)              We Performed the Following     Chlamydia trachomatis PCR     Neisseria gonorrhoeae " PCR     Pap imaged thin layer screen reflex to HPV if ASCUS - recommend age 25 - 29     Wet prep        Primary Care Provider Office Phone # Fax #    [facility] [phone] [address] [facility] [address]        Equal Access to Services     BRIAN LEDESMA : Hadii aad ku hadasho Soomaali, waaxda luqadaha, qaybta kaalmada adeegyada, waxay idiin hayaan adeeg jhoanajose luisjameson wolf. So [facility] [phone].    ATENCIÓN: Si habla español, tiene a yañez disposición servicios gratuitos de asistencia lingüística. Llame al [phone].    We comply with applicable federal civil rights laws and [place] laws. We do not discriminate on the basis of race, color, national origin, age, disability, sex, sexual orientation, or gender identity.            Thank you!     Thank you for choosing [facility]  for your care. Our goal is always to provide you with excellent care. Hearing back from our patients is one way we can continue to improve our services. Please take a few minutes to complete the written survey that you may receive in the mail after your visit with us. Thank you!             Your Updated Medication List - Protect others around you: Learn how to safely use, store and throw away your medicines at www.disposemymeds.org.          This list is accurate as of 3/1/18 11:50 AM.  Always use your most recent med list.                   Brand Name Dispense Instructions for use Diagnosis    albuterol 108 (90 BASE) MCG/ACT Inhaler    PROAIR HFA/PROVENTIL HFA/VENTOLIN HFA    1 Inhaler    Inhale 2 puffs into the lungs every 4 hours as needed for shortness of breath / dyspnea. May use 2 puffs 10 minutes before exercise.    Mild persistent asthma       norgestimate-ethinyl estradiol 0.25-35 MG-MCG per tablet    ORTHO-CYCLEN (28)    3 Package    Take 1 tablet by mouth daily

## 2018-03-01 NOTE — LETTER
March 6, 2018      Rachel PABON Jf  [address]  [facility] [address]    Dear ,      I am happy to inform you that your recent cervical cancer screening test (PAP smear) was normal.      Preventative screenings such as this help to ensure your health for years to come. You should repeat a pap smear in 3 years, unless otherwise directed.      You will still need to return to the clinic every year for your annual exam and other preventive tests.     Please contact the clinic at [phone] if you have further questions.       Sincerely,      Suly Montalvo DO/[facility]

## 2018-03-01 NOTE — NURSING NOTE
"Chief Complaint   Patient presents with     Pain     Painful intercourse     Vaginal Problem     Odor       Initial /72 (BP Location: Right arm, Patient Position: Sitting, Cuff Size: Adult Regular)  Pulse 85  Temp 99.2  F (37.3  C) (Tympanic)  Resp 16  Ht 5' 4\" (1.626 m)  Wt 121 lb (54.9 kg)  LMP 02/13/2018 (Approximate)  SpO2 99%  Breastfeeding? No  BMI 20.77 kg/m2 Estimated body mass index is 20.77 kg/(m^2) as calculated from the following:    Height as of this encounter: 5' 4\" (1.626 m).    Weight as of this encounter: 121 lb (54.9 kg).  Medication Reconciliation: complete     Mindy Orozco LPN  "

## 2018-03-01 NOTE — LETTER
"March 5, 2018      Rachel Rhoades  [address]  [facility] [address]        Dear ,    We are writing to inform you of your test results.    Your STD testing is NEGATIVE which means you do not have Gonorrhea or Chlamydia.    The vaginal discharge test (\"wet prep\") does show that you have bacterial vaginosis and a yeast infection (both which are not STD's).  These vaginal infections should get better with the Flagyl (Metronidazole) and Diflucan I sent you.    It was great to meet you, Rachel.  Hope you feel better soon!         Resulted Orders   Neisseria gonorrhoeae PCR   Result Value Ref Range    Specimen Descrip Cervix     N Gonorrhea PCR Negative NEG^Negative      Comment:      Negative for N. gonorrhoeae rRNA by transcription mediated amplification.  A negative result by transcription mediated amplification does not preclude   the presence of N. gonorrhoeae infection because results are dependent on   proper and adequate collection, absence of inhibitors, and sufficient rRNA to   be detected.     Chlamydia trachomatis PCR   Result Value Ref Range    Specimen Description Cervix     Chlamydia Trachomatis PCR Negative NEG^Negative      Comment:      Negative for C. trachomatis rRNA by transcription mediated amplification.  A negative result by transcription mediated amplification does not preclude   the presence of C. trachomatis infection because results are dependent on   proper and adequate collection, absence of inhibitors, and sufficient rRNA to   be detected.     Wet prep   Result Value Ref Range    Specimen Description Cervix     Special Requests Cervix     Wet Prep Clue cells seen (A)     Wet Prep No Trichomonas seen     Wet Prep Yeast seen (A)        If you have any questions or concerns, please call the clinic at the number listed above.       Sincerely,        Suly Montalvo, DO                "

## 2018-03-01 NOTE — PROGRESS NOTES
SUBJECTIVE:   Rachel Rhoades is a 27 year old female who presents to clinic today for the following health issues:        Vaginal Symptoms      Duration: X2 days    Description  vaginal discharge - creamy and odor    Intensity:  moderate    Accompanying signs and symptoms (fever/dysuria/abdominal or back pain): Painful [place]    History  Sexually active: yes, single partner, contraception - oral contraceptives (combined)  Possibility of pregnancy: No  Recent antibiotic use: no     Precipitating or alleviating factors: None    Therapies tried and outcome: none   Outcome: n/a    No UTI symptoms.  No fevers.   Has hx genital herpes, first outbreak was 2 years ago.  Last outbreak of genital herpes was 6 months ago.  Asthma is well controlled, on albuterol PRN.  Needs refill.    Takes OCP's; Needs OCP refill as well.      Problem list and histories reviewed & adjusted, as indicated.  Additional history: as documented    Labs reviewed in EPIC    Reviewed and updated as needed this visit by clinical staff  Tobacco  Allergies  Meds  Problems  Med Hx  Surg Hx  Fam Hx  Soc Hx        Reviewed and updated as needed this visit by Provider  Allergies  Meds  Problems         ROS:  CONSTITUTIONAL: NEGATIVE for fever, chills, change in weight  INTEGUMENTARY/SKIN: NEGATIVE for worrisome rashes, moles or lesions  EYES: NEGATIVE for vision changes or irritation  ENT/MOUTH: NEGATIVE for ear, mouth and throat problems  RESP: NEGATIVE for significant cough or SOB  CV: NEGATIVE for chest pain, palpitations or peripheral edema  GI: NEGATIVE for nausea, abdominal pain, heartburn, or change in bowel habits  : NEGATIVE for frequency, dysuria, or hematuria  MUSCULOSKELETAL: NEGATIVE for significant arthralgias or myalgia  HEME: NEGATIVE for bleeding problems  PSYCHIATRIC: NEGATIVE for changes in mood or affect    OBJECTIVE:     /72 (BP Location: Right arm, Patient Position: Sitting, Cuff Size: Adult Regular)  Pulse 85  " Temp 99.2  F (37.3  C) (Tympanic)  Resp 16  Ht 5' 4\" (1.626 m)  Wt 121 lb (54.9 kg)  LMP 02/13/2018 (Approximate)  SpO2 99%  Breastfeeding? No  BMI 20.77 kg/m2  Body mass index is 20.77 kg/(m^2).   GENERAL: healthy, alert and no distress  EYES: Eyes grossly normal to inspection, PERRL and conjunctivae and sclerae normal  HENT: ear canals and TM's normal, nose and mouth without ulcers or lesions  NECK: no adenopathy, no asymmetry, masses, or scars and thyroid normal to palpation  RESP: lungs clear to auscultation - no rales, rhonchi or wheezes  CV: regular rate and rhythm, normal S1 S2, no S3 or S4, no murmur, click or rub, no peripheral edema and peripheral pulses strong  ABDOMEN: soft, nontender, no hepatosplenomegaly, no masses and bowel sounds normal   (female): normal female external genitalia, normal urethral meatus , vaginal mucosa pink, moist, well rugated, normal cervix, adnexae, and uterus without masses.  Cervical discharge present in vaginal orifice/cervix: white/thick discharge with fishy odor.  MS: no gross musculoskeletal defects noted, no edema  SKIN: no suspicious lesions or rashes  NEURO: Normal strength and tone, mentation intact and speech normal  PSYCH: mentation appears normal, affect normal/bright    Diagnostic Test Results:  Wet prep: +BV, Yeast.  Gonorrhea/Chlamydia  ASSESSMENT/PLAN:     Problem List Items Addressed This Visit     None      Visit Diagnoses     Bacterial vaginal infection    -  Primary    Relevant Medications    metroNIDAZOLE (FLAGYL) 500 MG tablet    Yeast infection of the vagina        Relevant Medications    albuterol (PROAIR HFA/PROVENTIL HFA/VENTOLIN HFA) 108 (90 BASE) MCG/ACT Inhaler    fluconazole (DIFLUCAN) 150 MG tablet    Vaginal discharge        Relevant Orders    Neisseria gonorrhoeae PCR (Completed)    Chlamydia trachomatis PCR (Completed)    Wet prep (Completed)    Cervical cancer screening        Relevant Orders    Pap imaged thin layer screen reflex " to HPV if ASCUS - recommend age 25 - 29 (Completed)    Encounter for surveillance of contraceptive pills        Relevant Medications    norgestimate-ethinyl estradiol (ORTHO-CYCLEN, 28,) 0.25-35 MG-MCG per tablet    Mild persistent asthma, unspecified whether complicated        Relevant Medications    albuterol (PROAIR HFA/PROVENTIL HFA/VENTOLIN HFA) 108 (90 BASE) MCG/ACT Inhaler         --Wet prep: +BV, yeast.  Rx Flagyl and Fluconazole.  --Due for Pap smear, obtained today  --Requesting refill for asthma med, Rx albuterol  --Requesting refill for contraception.  Normal/regular periods.  Rx Ortho-Cyclen  --Follow-up recommended for yearly preventive exams or sooner for an acute issues.            Suly Montalvo, DO  [facility]

## 2018-03-01 NOTE — LETTER
My Asthma Action Plan  Name: Rachel Rhoades   YOB: 1990  Date: 3/1/2018   My doctor: Suly Montalvo, DO   My clinic: [facility]        My Control Medicine: { :506429}  My Rescue Medicine: { :795799}  {AAP include Oral Steroid:113233} My Asthma Severity: { :213922}  Avoid your asthma triggers: { :203247}        {Is patient a child or adult?:567943}       GREEN ZONE   Good Control    I feel good    No cough or wheeze    Can work, sleep and play without asthma symptoms       Take your asthma control medicine every day.     1. If exercise triggers your asthma, take your rescue medication    15 minutes before exercise or sports, and    During exercise if you have asthma symptoms  2. Spacer to use with inhaler: If you have a spacer, make sure to use it with your inhaler             YELLOW ZONE Getting Worse  I have ANY of these:    I do not feel good    Cough or wheeze    Chest feels tight    Wake up at night   1. Keep taking your Green Zone medications  2. Start taking your rescue medicine:    every 20 minutes for up to 1 hour. Then every 4 hours for 24-48 hours.  3. If you stay in the Yellow Zone for more than 12-24 hours, contact your doctor.  4. If you do not return to the Green Zone in 12-24 hours or you get worse, start taking your oral steroid medicine if prescribed by your provider.           RED ZONE Medical Alert - Get Help  I have ANY of these:    I feel awful    Medicine is not helping    Breathing getting harder    Trouble walking or talking    Nose opens wide to breathe       1. Take your rescue medicine NOW  2. If your provider has prescribed an oral steroid medicine, start taking it NOW  3. Call your doctor NOW  4. If you are still in the Red Zone after 20 minutes and you have not reached your doctor:    Take your rescue medicine again and    Call 911 or go to the emergency room right away    See your regular doctor within 2 weeks of an Emergency Room or  Urgent Care visit for follow-up treatment.        Electronically signed by: Mindy Song, March 1, 2018    Annual Reminders:  Meet with Asthma Educator,  Flu Shot in the Fall, consider Pneumonia Vaccination for patients with asthma (aged 19 and older).    Pharmacy:    [address], [facility] [id number] [place] [address] [address] AT [facility] [address]                    Asthma Triggers  How To Control Things That Make Your Asthma Worse    Triggers are things that make your asthma worse.  Look at the list below to help you find your triggers and what you can do about them.  You can help prevent asthma flare-ups by staying away from your triggers.      Trigger                                                          What you can do   Cigarette Smoke  Tobacco smoke can make asthma worse. Do not allow smoking in your home, car or around you.  Be sure no one smokes at a child s day care or school.  If you smoke, ask your health care provider for ways to help you quit.  Ask family members to quit too.  Ask your health care provider for a referral to Quit Plan to help you quit smoking, or call 7-069-156-PLAN.     Colds, Flu, Bronchitis  These are common triggers of asthma. Wash your hands often.  Don t touch your eyes, nose or mouth.  Get a flu shot every year.     Dust Mites  These are tiny bugs that live in cloth or carpet. They are too small to see. Wash sheets and blankets in hot water every week.   Encase pillows and mattress in dust mite proof covers.  Avoid having carpet if you can. If you have carpet, vacuum weekly.   Use a dust mask and HEPA vacuum.   Pollen and Outdoor Mold  Some people are allergic to trees, grass, or weed pollen, or molds. Try to keep your windows closed.  Limit time out doors when pollen count is high.   Ask you health care provider about taking medicine during allergy season.     Animal Dander  Some people are allergic to skin flakes, urine or saliva  from pets with fur or feathers. Keep pets with fur or feathers out of your home.    If you can t keep the pet outdoors, then keep the pet out of your bedroom.  Keep the bedroom door closed.  Keep pets off cloth furniture and away from stuffed toys.     Mice, Rats, and Cockroaches  Some people are allergic to the waste from these pests.   Cover food and garbage.  Clean up spills and food crumbs.  Store grease in the refrigerator.   Keep food out of the bedroom.   Indoor Mold  This can be a trigger if your home has high moisture. Fix leaking faucets, pipes, or other sources of water.   Clean moldy surfaces.  Dehumidify basement if it is damp and smelly.   Smoke, Strong Odors, and Sprays  These can reduce air quality. Stay away from strong odors and sprays, such as perfume, powder, hair spray, paints, smoke incense, paint, cleaning products, candles and new carpet.   Exercise or Sports  Some people with asthma have this trigger. Be active!  Ask your doctor about taking medicine before sports or exercise to prevent symptoms.    Warm up for 5-10 minutes before and after sports or exercise.     Other Triggers of Asthma  Cold air:  Cover your nose and mouth with a scarf.  Sometimes laughing or crying can be a trigger.  Some medicines and food can trigger asthma.

## 2018-03-01 NOTE — PATIENT INSTRUCTIONS
Preventing Vaginitis     Use mild, unscented soap when you bathe or shower to avoid irritating your vagina.    Vaginitis is irritation or infection of the vagina or vulva (the outside opening of the vagina). Vaginitis can be caused by bacteria, viruses, parasites, or yeast. Chemicals (such as in perfumes or soaps or in spermicides) can sometimes be a cause. Vaginitis can be caused by hormone changes in pregnancy or with menopause. You can help prevent vaginitis. Follow the tips below. And see your healthcare provider if you have any symptoms.  Hygiene    Avoid chemicals. Do not use vaginal sprays. Do not use scented toilet paper or tampons that are scented. Sprays and scents have chemicals that can irritate your vagina.    Do not douche unless you are told to by your healthcare provider. Douching is rarely needed. And it upsets the normal balance in the vagina.    Wash yourself well. Wash the outer vaginal area (vulva) every day with mild, unscented soap. Keep it as dry as possible.    Wipe correctly. Make sure to wipe from front to back after a bowel movement. This helps keep from spreading bacteria from your anus to your vagina.    Change your tampon often. During your period, make sure to change your tampon as often as directed on the package. This allows the normal flow of vaginal discharge and blood.  Lifestyle    Limit your number of sexual partners. The more partners you have, the greater your risk of infection. Using condoms helps reduce your risk.    Get enough sleep. Sleep helps keep your body s immune system healthy. This helps you fight infection.    Lose weight, if needed. Excess weight can reduce air circulation around your vagina. This can increase your risk of infection.    Exercise regularly. Regular activity helps keep your body healthy.    Take antibiotics only as directed. Antibiotics can change the normal chemical balance in the vagina.    Clothing    Don t sit in wet clothes. Yeast thrives  when it s warm and damp.    Don t wear tight pants. And don t wear tights, leggings, or hose without a cotton crotch. These types of clothing trap warmth and moisture.    Wear cotton underwear. Cotton lets air circulate around the vagina.  Symptoms of vaginitis    Irritation, swelling, or itching of the genital area    Vaginal discharge    Bad vaginal odor    Pain or burning during urination   Date Last Reviewed: 12/1/2016 2000-2017 [employer]. [address] [place], [place] [id number]. All rights reserved. This information is not intended as a substitute for professional medical care. Always follow your healthcare professional's instructions.          Vaginal Infection: Bacterial Vaginosis  Both good and bad bacteria are present in a healthy vagina. Bacterial vaginosis (BV) occurs when these bacteria get out of balance. The numbers of good bacteria decrease. This allows the numbers of bad bacteria to increase and cause BV. In most cases, BV is not a serious problem. This sheet tells you more about BV and how it can be treated.  Causes of Bacterial Vaginosis  The cause of BV is not clear. Douching may lead to it. Having sex with a new partner or more than 1 partner makes it more likely.  Symptoms of Bacterial Vaginosis  Symptoms of BV vary for each woman. Some women have few symptoms or none at all. If symptoms are present, they can include:    Thin, milky white or gray discharge    Unpleasant  fishy  odor    Irritation, itching, and burning at opening of vagina.    Burning or irritation with sex or urination  Diagnosing Bacterial Vaginosis  Your health care provider will ask about your symptoms and health history. He or she will also perform a pelvic exam. This is an exam of your vagina and cervix. A sample of vaginal fluid or discharge may be taken. This sample is checked for signs of BV.  Treating Bacterial Vaginosis  BV is often treated with antibiotics. They may be given in oral pill form or  as a vaginal cream. To use these medications:    Be sure to take all of your medication, even if your symptoms go away.    If you re taking antibiotic pills, do not drink alcohol until you re finished with all of your medication.    If you re using vaginal cream, apply it as directed. Be aware that the cream may make condoms and diaphragms less effective.    Call your health care provider if symptoms do not go away within 4 days of starting treatment. Also call if you have a reaction to the medication.  Why Treatment Matters  Even if you have no symptoms or your symptoms are mild, BV should be treated. Untreated BV can lead to health problems such as:    Increased risk of  delivery if you re pregnant.    Increased risk of complications after surgery on the reproductive organs.    Possible increased risk of pelvic inflammatory disease (PID).     3742-9179 [employer]. [address] [place] [address]. All rights reserved. This information is not intended as a substitute for professional medical care. Always follow your healthcare professional's instructions.  This information has been modified by your health care provider with permission from the publisher.        Vaginal Infection: Yeast (Candidiasis)  Yeast infection occurs when yeast in the vagina increase and attacks the vaginal tissues. Yeast is a type of fungus. These infections are often caused by a type of yeast called Candida albicans. Other species of yeast can also cause infections. Factors that may make infection more likely include recent antibiotic use, douching, or increased sex. Yeast infections are more common in women who have diabetes, or are obese or pregnant, or have a weak immune system.  Symptoms of yeast infection    Clumpy or thin, white discharge, which may look like cottage cheese    No odor or minimal odor    Severe vaginal itching or burning    Burning with urination    Swelling, redness of vulva    Pain  during sex  Treating yeast infection  Yeast infection is treated with a vaginal antifungal cream. In some cases, antifungal pills are prescribed instead. During treatment:    Finish all of your medicine, even if your symptoms go away.    Apply the cream before going to bed. Lie flat after applying so that it doesn't drip out.    Do not douche or use tampons.    Don't rely on a diaphragm or condoms, since the cream may weaken them.    Avoid intercourse if advised by your healthcare provider.     Should I treat a yeast infection myself?  Discuss with your healthcare provider whether you should use over-the-counter medicines to treat a yeast infection. Self-treatment may depend on whether:    You've had a yeast infection in the past.    You're at risk for STDs.  Call your healthcare provider if symptoms do not go away or come back after treatment.   Date Last Reviewed: 3/1/2017    5034-6552 [employer] [address] [place] [address], [place] [address]. All rights reserved. This information is not intended as a substitute for professional medical care. Always follow your healthcare professional's instructions.

## 2018-03-02 LAB
C TRACH DNA SPEC QL NAA+PROBE: NEGATIVE
N GONORRHOEA DNA SPEC QL NAA+PROBE: NEGATIVE
SPECIMEN SOURCE: NORMAL
SPECIMEN SOURCE: NORMAL

## 2018-03-02 ASSESSMENT — ASTHMA QUESTIONNAIRES: ACT_TOTALSCORE: 21

## 2018-03-05 LAB
COPATH REPORT: NORMAL
PAP: NORMAL

## 2018-06-06 ENCOUNTER — OFFICE VISIT (OUTPATIENT)
Dept: DERMATOLOGY | Facility: CLINIC | Age: 28
End: 2018-06-06
Payer: COMMERCIAL

## 2018-06-06 VITALS — SYSTOLIC BLOOD PRESSURE: 126 MMHG | DIASTOLIC BLOOD PRESSURE: 79 MMHG | OXYGEN SATURATION: 97 % | HEART RATE: 104 BPM

## 2018-06-06 DIAGNOSIS — L70.0 ACNE VULGARIS: Primary | ICD-10-CM

## 2018-06-06 DIAGNOSIS — L90.5 ACNE SCARRING: ICD-10-CM

## 2018-06-06 DIAGNOSIS — L81.0 POST-INFLAMMATORY HYPERPIGMENTATION: ICD-10-CM

## 2018-06-06 PROCEDURE — 99203 OFFICE O/P NEW LOW 30 MIN: CPT | Performed by: PHYSICIAN ASSISTANT

## 2018-06-06 RX ORDER — AMOXICILLIN 500 MG/1
CAPSULE ORAL
Qty: 60 CAPSULE | Refills: 2 | Status: SHIPPED | OUTPATIENT
Start: 2018-06-06 | End: 2019-03-27

## 2018-06-06 RX ORDER — TRETINOIN 0.25 MG/G
CREAM TOPICAL
Qty: 45 G | Refills: 3 | Status: SHIPPED | OUTPATIENT
Start: 2018-06-06 | End: 2019-03-27

## 2018-06-06 NOTE — MR AVS SNAPSHOT
After Visit Summary   6/6/2018    Rachel Rhoades    MRN: [id number]           Patient Information     Date Of Birth          1990        Visit Information        Provider Department      6/6/2018 3:00 PM Chloe Nolen PA-C [facility]        Today's Diagnoses     Acne vulgaris    -  1    Acne scarring        Post-inflammatory hyperpigmentation          Care Instructions    Acne vulgaris    Morning regimen:    Wash with your clean and clear  Apply a gentle moisturizer*  Take amoxicillin     Evening regimen:    Wash with benzoyl peroxide (Differin makes a 5%)   Apply tretinoin   Apply a gentle moisturizer (do not need sunscreen at night)  Take amoxicillin     *Facial moisturizer (preferably with an SPF of 30 or greater) such as Cetaphil, CeraVe, or Vanicream. Make sure lotion is non-comedogenic. Sunscreen active ingredients: Physical blockers are less likely to clog pores. Examples include titanium dioxide and zinc oxide  Good body moisturizers include Cetaphil, CeraVe, Eucerin, and Vanicream.    Disc Tretinoin at bedtime, dryness, irritation and way to prevent discussed with patient   Benzoyl Peroxide (BPO) wash daily or every other day depending on dryness  (2.5%-5% BPO on face and 5%-10% on chest and back)  Aggressive use of bland emollients discussed with patient   If taking Doxycycline take with food but avoid calcium-rich foods as this can reduce the efficacy of Doxycycline. GI upset, esophagitis, and UV precautions discussed with patient     May take 2-3 months to see 50-70% improvement  May get worse during initial phase of treatment    Pathophysiology discussed with patient and information provided.  I discussed with patient oral versus topical treatments such as oral antibiotics, Spironolactone (Aldactone)(women only),oral contraceptive pills (women only) topical creams,  and over-the-counter treatments.     Acne can be effectively treated, although  response may sometimes be slow.   Where possible, avoid excessively humid conditions such as a sauna, working in an unventilated kitchen or tropical vacations.   If you smoke, stop. Nicotine increases sebum retention and increased scale within the follicles, forming comedones (black and whiteheads).    Minimize the application of oils and cosmetics to the affected skin.  Abrasive skin treatments can aggravate acne.   Try not to scratch or pick the spots as this can increase your risk for permanent scarring in the area.   To avoid sunburn, protect your skin outdoors using a broad spectrum (UVB and UVA) sunscreen and protective clothing.  No relationship between particular foods and acne has been proven. However, reports suggest low glycemic and low dairy diet are helpful for some people.    ACNE INFORMATION     Acne is a skin disease affecting oil glands and hair follicles in your skin.  When these pores do not drain properly, hair follicles can becomes clogged and bacteria can be trapped causing inflammation to occur. Clinical manifestations range from mild to severe, such as comedones (whiteheads and blackheads) or cysts.     Several factors contribute to acne:     1. Hormonal- Androgen (a type of hormone) can cause oil glands to enlarges and produces more sebum (oil).    2. Bacterial- A specific type of bacteria called Propionibacterium acnes are found in these oily follicles and stimulate more inflammation.     3. Genetics- History of family members (parents or siblings)     4. External factors- mechanical trauma, cosmetics, topical steroids or some oral medications.      Combination therapy is the mainstay of treatment given the multiple factors contributing to acne.  The type of treatment is also dependant on whether you are pregnancy or breastfeeding.     TOPICAL TREATMENTS   Topical Antibiotics These medications help prevent growth of bacteria in your pores.   Topical Exfoliating Agent These are drying agents  that will help normalize oil production, unplug pores and reduce bacterial growth. (Note: Make sure you discontinue this medication ONE week prior to waxing or your skin may lift.)     ORAL TREATMENTS   Oral Antibiotics: Tetracyclines are the most commonly used oral antibiotics to treat moderate to severe acne. They are safe to take for months at a time. Some side effects to these medications include: sun sensitivity, stomach upset, dizziness and heartburn. If you experience a sudden onset of rash or severe unusual headache, discontinue the medication and contact your clinician immediately.     Spironolactone: This oral medication blocks androgens (hormones that can aggravate acne).  Since this medication is also used as a diuretic, baseline blood work is needed to check your electrolytes and liver function. Do not get pregnant while on this medication as it can cause birth defects. Make sure to drink plenty of water.    Birth Control Pills (Females only). In order to decrease hormonal fluctuations that affect acne, birth control pills such as Luna  or June  ( and others) can be effective in treating acne.  We advise you to discuss with your OB/GYN or Primary Care Doctor to be screened and to check if you are eligible to be on this pill.     Accutane  (generic: isotretinoin). This oral medication is a retinoid (Vitamin A derivative like Retin-A) used to treat severe acne that does not respond to the above medications. Accutane  can  help clear acne for years and the average period of treatment is five to seven months, however, the duration of treatment may be adjusted based on severity. Some people may need more than one treatment.     ALTERNATIVE TREATMENTS   Microdermabrasion & light chemical peels help improve skin texture, decrease pore size, decrease mild scarring & increase absorption of your topical treatments     Blue Spectrum Light Therapy consists of a concentrated visible light that kills bacteria in the  oil ducts. It is safe for pregnancy & nursing. At times, ALA (a topical solution) may be applied prior to the Blue Light exposure in order to enhance light penetration.     Pulse Dye Laser (PDL) decreases inflammation and improves certain acne scars.     Recommended facial cleansers, moisturizers & cosmetics:   Cetaphil  CeraVe  (Note: Look for  non-comeodogenic  cleansers & moisturizers)   Clinique , Prescriptives  make-up               Follow-ups after your visit        Who to contact     If you have questions or need follow up information about today's clinic visit or your schedule please contact [facility] directly at [phone].  Normal or non-critical lab and imaging results will be communicated to you by MyChart, letter or phone within 4 business days after the clinic has received the results. If you do not hear from us within 7 days, please contact the clinic through MyChart or phone. If you have a critical or abnormal lab result, we will notify you by phone as soon as possible.  Submit refill requests through [employer] or call your pharmacy and they will forward the refill request to us. Please allow 3 business days for your refill to be completed.          Additional Information About Your Visit        Care EveryWhere ID     This is your Care EveryWhere ID. This could be used by other organizations to access your [facility] medical records  [id number]        Your Vitals Were     Pulse Last Period Pulse Oximetry Breastfeeding?          104 05/10/2018 (Exact Date) 97% No         Blood Pressure from Last 3 Encounters:   06/06/18 126/79   03/01/18 108/72   01/22/18 129/87    Weight from Last 3 Encounters:   03/01/18 54.9 kg (121 lb)   01/22/18 53.7 kg (118 lb 4.8 oz)   07/13/17 52.6 kg (116 lb)              Today, you had the following     No orders found for display         Today's Medication Changes          These changes are accurate as of 6/6/18  3:19 PM.  If you have any  questions, ask your nurse or doctor.               Start taking these medicines.        Dose/Directions    amoxicillin 500 MG capsule   Commonly known as:  AMOXIL   Used for:  Acne vulgaris   Started by:  Chloe Nolen PA-C        Take one capsule in the morning and one capsule at night   Quantity:  60 capsule   Refills:  2       tretinoin 0.025 % cream   Commonly known as:  RETIN-A   Used for:  Acne vulgaris, Acne scarring, Post-inflammatory hyperpigmentation   Started by:  Chloe Nolen PA-C        Apply a pea-sized amount to face. Start every 3-4 nights working up to every night.   Quantity:  45 g   Refills:  3            Where to get your medicines      These medications were sent to [facility] [id number] [place] [address] [address] AT [facility] [address]  [address], [facility] [address]     Phone:  [phone]     amoxicillin 500 MG capsule    tretinoin 0.025 % cream                Primary Care Provider Office Phone # Fax #    Suly Montalvo [phone] [address] [facility] [[email or web address]] [address]  [facility] [address]        Equal Access to Services     BRIAN LEDESMA AH: Hadii aad ku hadasho Soomaali, waaxda luqadaha, qaybta kaalmada adeegyada, waxay idiin haychanin elizabeth wolf. So [facility] [phone].    ATENCIÓN: Si habla español, tiene a yañez disposición servicios gratuitos de asistencia lingüística. [facility] [phone].    We comply with applicable federal civil rights laws and [place] laws. We do not discriminate on the basis of race, color, national origin, age, disability, sex, sexual orientation, or gender identity.            Thank you!     Thank you for choosing [facility]  for your care. Our goal is always to provide you with excellent care. Hearing back from our patients is one way we can continue to improve our services. Please take a few minutes to complete the written survey that you may receive in the mail  after your visit with us. Thank you!             Your Updated Medication List - Protect others around you: Learn how to safely use, store and throw away your medicines at www.disposemymeds.org.          This list is accurate as of 6/6/18  3:19 PM.  Always use your most recent med list.                   Brand Name Dispense Instructions for use Diagnosis    albuterol 108 (90 Base) MCG/ACT Inhaler    PROAIR HFA/PROVENTIL HFA/VENTOLIN HFA    1 Inhaler    Inhale 2 puffs into the lungs every 4 hours as needed for shortness of breath / dyspnea May use 2 puffs 10 minutes before exercise.    Mild persistent asthma, unspecified whether complicated       amoxicillin 500 MG capsule    AMOXIL    60 capsule    Take one capsule in the morning and one capsule at night    Acne vulgaris       fluconazole 150 MG tablet    DIFLUCAN    4 tablet    Take 1 tab (150 mg) once.  If symptoms continue, may take another dose every 3 days if needed.    Yeast infection of the vagina       metroNIDAZOLE 500 MG tablet    FLAGYL    14 tablet    Take 1 tablet (500 mg) by mouth 2 times daily    Bacterial vaginal infection       norgestimate-ethinyl estradiol 0.25-35 MG-MCG per tablet    ORTHO-CYCLEN (28)    28 tablet    Take 1 tablet by mouth daily    Encounter for surveillance of contraceptive pills       tretinoin 0.025 % cream    RETIN-A    45 g    Apply a pea-sized amount to face. Start every 3-4 nights working up to every night.    Acne vulgaris, Acne scarring, Post-inflammatory hyperpigmentation

## 2018-06-06 NOTE — PATIENT INSTRUCTIONS
Acne vulgaris    Morning regimen:    Wash with your clean and clear  Apply a gentle moisturizer*  Take amoxicillin     Evening regimen:    Wash with benzoyl peroxide (Differin makes a 5%)   Apply tretinoin   Apply a gentle moisturizer (do not need sunscreen at night)  Take amoxicillin     *Facial moisturizer (preferably with an SPF of 30 or greater) such as Cetaphil, CeraVe, or Vanicream. Make sure lotion is non-comedogenic. Sunscreen active ingredients: Physical blockers are less likely to clog pores. Examples include titanium dioxide and zinc oxide  Good body moisturizers include Cetaphil, CeraVe, Eucerin, and Vanicream.    Disc Tretinoin at bedtime, dryness, irritation and way to prevent discussed with patient   Benzoyl Peroxide (BPO) wash daily or every other day depending on dryness  (2.5%-5% BPO on face and 5%-10% on chest and back)  Aggressive use of bland emollients discussed with patient   If taking Doxycycline take with food but avoid calcium-rich foods as this can reduce the efficacy of Doxycycline. GI upset, esophagitis, and UV precautions discussed with patient     May take 2-3 months to see 50-70% improvement  May get worse during initial phase of treatment    Pathophysiology discussed with patient and information provided.  I discussed with patient oral versus topical treatments such as oral antibiotics, Spironolactone (Aldactone)(women only),oral contraceptive pills (women only) topical creams,  and over-the-counter treatments.     Acne can be effectively treated, although response may sometimes be slow.   Where possible, avoid excessively humid conditions such as a sauna, working in an unventilated kitchen or tropical vacations.   If you smoke, stop. Nicotine increases sebum retention and increased scale within the follicles, forming comedones (black and whiteheads).    Minimize the application of oils and cosmetics to the affected skin.  Abrasive skin treatments can aggravate acne.   Try not to  scratch or pick the spots as this can increase your risk for permanent scarring in the area.   To avoid sunburn, protect your skin outdoors using a broad spectrum (UVB and UVA) sunscreen and protective clothing.  No relationship between particular foods and acne has been proven. However, reports suggest low glycemic and low dairy diet are helpful for some people.    ACNE INFORMATION     Acne is a skin disease affecting oil glands and hair follicles in your skin.  When these pores do not drain properly, hair follicles can becomes clogged and bacteria can be trapped causing inflammation to occur. Clinical manifestations range from mild to severe, such as comedones (whiteheads and blackheads) or cysts.     Several factors contribute to acne:     1. Hormonal- Androgen (a type of hormone) can cause oil glands to enlarges and produces more sebum (oil).    2. Bacterial- A specific type of bacteria called Propionibacterium acnes are found in these oily follicles and stimulate more inflammation.     3. Genetics- History of family members (parents or siblings)     4. External factors- mechanical trauma, cosmetics, topical steroids or some oral medications.      Combination therapy is the mainstay of treatment given the multiple factors contributing to acne.  The type of treatment is also dependant on whether you are pregnancy or breastfeeding.     TOPICAL TREATMENTS   Topical Antibiotics These medications help prevent growth of bacteria in your pores.   Topical Exfoliating Agent These are drying agents that will help normalize oil production, unplug pores and reduce bacterial growth. (Note: Make sure you discontinue this medication ONE week prior to waxing or your skin may lift.)     ORAL TREATMENTS   Oral Antibiotics: Tetracyclines are the most commonly used oral antibiotics to treat moderate to severe acne. They are safe to take for months at a time. Some side effects to these medications include: sun sensitivity, stomach  upset, dizziness and heartburn. If you experience a sudden onset of rash or severe unusual headache, discontinue the medication and contact your clinician immediately.     Spironolactone: This oral medication blocks androgens (hormones that can aggravate acne).  Since this medication is also used as a diuretic, baseline blood work is needed to check your electrolytes and liver function. Do not get pregnant while on this medication as it can cause birth defects. Make sure to drink plenty of water.    Birth Control Pills (Females only). In order to decrease hormonal fluctuations that affect acne, birth control pills such as Luna  or June  ( and others) can be effective in treating acne.  We advise you to discuss with your OB/GYN or Primary Care Doctor to be screened and to check if you are eligible to be on this pill.     Accutane  (generic: isotretinoin). This oral medication is a retinoid (Vitamin A derivative like Retin-A) used to treat severe acne that does not respond to the above medications. Accutane  can  help clear acne for years and the average period of treatment is five to seven months, however, the duration of treatment may be adjusted based on severity. Some people may need more than one treatment.     ALTERNATIVE TREATMENTS   Microdermabrasion & light chemical peels help improve skin texture, decrease pore size, decrease mild scarring & increase absorption of your topical treatments     Blue Spectrum Light Therapy consists of a concentrated visible light that kills bacteria in the oil ducts. It is safe for pregnancy & nursing. At times, ALA (a topical solution) may be applied prior to the Blue Light exposure in order to enhance light penetration.     Pulse Dye Laser (PDL) decreases inflammation and improves certain acne scars.     Recommended facial cleansers, moisturizers & cosmetics:   Cetaphil  CeraVe  (Note: Look for  non-comeodogenic  cleansers & moisturizers)   Clinique , Prescriptives  make-up

## 2018-06-06 NOTE — PROGRESS NOTES
HPI:  Rachel Rhoades is a 27 year old year old female patient here today for cystic acne on face   Duration: years  Symptoms:  Painful cysts on cheeks     Previous treatments: TMC injection, topical OTC spot tx and hand-held OTC red light tx with minimal benefit     Alleviating/aggravating factors: none    Associated symptoms: none  Additional findings:is NOT worse around menstration  Patient has no other skin complaints today.  Remainder of the HPI, Meds, PMH, Allergies, FH, and SH was reviewed in chart.      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     Homar-Parkinson-White Syndrome  Dr Zapata  out of [facility]     Asthma     exercise induced       Past Surgical History:   Procedure Laterality Date     C ABLATION SUPRAVENT ARRHYTHMOGENIC FOCUS  8/2007    radiofrequency cardiac ablation for WPW        Family History   Problem Relation Age of Onset     Hypertension Mother      CEREBROVASCULAR DISEASE Sister      Asthma Sister      sports induced     Hypertension Maternal Aunt      Hypertension Maternal Uncle      CEREBROVASCULAR DISEASE Maternal Grandfather      Breast Cancer Paternal Grandmother      HEART DISEASE Paternal Grandmother      Allergies Sister      seasonal       Social History     Social History     Marital status: Single     Spouse name: N/A     Number of children: 0     Years of education: 14     Occupational History      [employer]     student       tech - early childhood     Social History Main Topics     Smoking status: Never Smoker     Smokeless tobacco: Never Used     Alcohol use No     Drug use: No     Sexual activity: Yes     Partners: Male     Birth control/ protection: Pill, Condom      Comment: asked 1/7/13     Other Topics Concern      Service No     Blood Transfusions No     Caffeine Concern No     Occupational Exposure No     Hobby Hazards No     Sleep Concern No     Stress Concern No     Weight Concern No     Special Diet  No     Exercise Yes     3-4 days per week, cardio, stationary bike, run, elliptical     Seat Belt Yes     Self-Exams Yes     breast-yes, skin-yes     Social History Narrative       Outpatient Encounter Prescriptions as of 6/6/2018   Medication Sig Dispense Refill     albuterol (PROAIR HFA/PROVENTIL HFA/VENTOLIN HFA) 108 (90 BASE) MCG/ACT Inhaler Inhale 2 puffs into the lungs every 4 hours as needed for shortness of breath / dyspnea May use 2 puffs 10 minutes before exercise. 1 Inhaler 5     amoxicillin (AMOXIL) 500 MG capsule Take one capsule in the morning and one capsule at night 60 capsule 2     fluconazole (DIFLUCAN) 150 MG tablet Take 1 tab (150 mg) once.  If symptoms continue, may take another dose every 3 days if needed. 4 tablet 0     metroNIDAZOLE (FLAGYL) 500 MG tablet Take 1 tablet (500 mg) by mouth 2 times daily 14 tablet 0     norgestimate-ethinyl estradiol (ORTHO-CYCLEN, 28,) 0.25-35 MG-MCG per tablet Take 1 tablet by mouth daily 28 tablet 3     tretinoin (RETIN-A) 0.025 % cream Apply a pea-sized amount to face. Start every 3-4 nights working up to every night. 45 g 3     No facility-administered encounter medications on file as of 6/6/2018.        Review Of Systems:  Skin: As above  Eyes: negative  Ears/Nose/Throat: negative  Respiratory: No shortness of breath, dyspnea on exertion, cough, or hemoptysis  Cardiovascular: negative  Gastrointestinal: negative  Genitourinary: negative  Musculoskeletal: negative  Neurologic: negative  Psychiatric: negative  Hematologic/Lymphatic/Immunologic: negative  Endocrine: negative      Objective:     /79  Pulse 104  LMP 05/10/2018 (Exact Date)  SpO2 97%  Breastfeeding? No  Eyes: Conjunctivae/lids: Normal   ENT: Lips:  Normal  MSK: Normal  Pulm: Breathing Normal  Neuro/Psych: Orientation: Normal; Mood/Affect: Normal, NAD, WDWN  Following areas examined: face, neck, upper chest, back  Findings:    1) cystic acne on cheeks, few inflammatory lesions on face,  Light brown/pink smooth macules of face, closed comedones on forehead  2) Well circumscribed macules with symmetric color distribution on mid chest      Assessment and Plan:  1) Acne vulgaris    Morning regimen:    Wash with your clean and clear  Apply a gentle moisturizer*  Take amoxicillin     Evening regimen:    Wash with benzoyl peroxide (Differin makes a 5%)   Apply tretinoin   Apply a gentle moisturizer (do not need sunscreen at night)  Take amoxicillin     *Facial moisturizer (preferably with an SPF of 30 or greater) such as Cetaphil, CeraVe, or Vanicream. Make sure lotion is non-comedogenic. Sunscreen active ingredients: Physical blockers are less likely to clog pores. Examples include titanium dioxide and zinc oxide  Good body moisturizers include Cetaphil, CeraVe, Eucerin, and Vanicream.    Disc Tretinoin at bedtime, dryness, irritation and way to prevent discussed with patient   Benzoyl Peroxide (BPO) wash daily or every other day depending on dryness  (2.5%-5% BPO on face and 5%-10% on chest and back)  Aggressive use of bland emollients discussed with patient   Oral antibiotics: GI upset, esophagitis, and UV precautions discussed with patient     May take 2-3 months to see 50-70% improvement  May get worse during initial phase of treatment    Pathophysiology discussed with patient and information provided.  I discussed with patient oral versus topical treatments such as oral antibiotics, Spironolactone (Aldactone)(women only),oral contraceptive pills (women only) topical creams,  and over-the-counter treatments.     Acne can be effectively treated, although response may sometimes be slow.   Where possible, avoid excessively humid conditions such as a sauna, working in an unventilated kitchen or tropical vacations.   If you smoke, stop. Nicotine increases sebum retention and increased scale within the follicles, forming comedones (black and whiteheads).    Minimize the application of oils and cosmetics to  the affected skin.  Abrasive skin treatments can aggravate acne.   Try not to scratch or pick the spots as this can increase your risk for permanent scarring in the area.   To avoid sunburn, protect your skin outdoors using a broad spectrum (UVB and UVA) sunscreen and protective clothing.  No relationship between particular foods and acne has been proven. However, reports suggest low glycemic and low dairy diet are helpful for some people.    2) Benign nevus on mid chest    I discussed the specifics of tumor, prognosis, and genetics of benign lesions.  I explained that treatment of these lesions would be purely cosmetic and not medically neccessary.  I discussed with patient different removal options including excision, cryotherapy, cautery and /or laser.          Follow up in 2-3 months

## 2018-06-06 NOTE — LETTER
6/6/2018         RE: Rachel Rhoades  [address]  [place] [facility] [address]        Dear Colleague,    Thank you for referring your patient, Rachel Rhoades, to the [facility]. Please see a copy of my visit note below.    HPI:  Rachel Rhoades is a 27 year old year old female patient here today for cystic acne on face   Duration: years  Symptoms:  Painful cysts on cheeks     Previous treatments: TMC injection, topical OTC spot tx and hand-held OTC red light tx with minimal benefit     Alleviating/aggravating factors: none    Associated symptoms: none  Additional findings:is NOT worse around menstration  Patient has no other skin complaints today.  Remainder of the HPI, Meds, PMH, Allergies, FH, and SH was reviewed in chart.      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     Homar-Parkinson-White Syndrome  Dr Zapata  out of [facility]     Asthma     exercise induced       Past Surgical History:   Procedure Laterality Date     C ABLATION SUPRAVENT ARRHYTHMOGENIC FOCUS  8/2007    radiofrequency cardiac ablation for WPW        Family History   Problem Relation Age of Onset     Hypertension Mother      CEREBROVASCULAR DISEASE Sister      Asthma Sister      sports induced     Hypertension Maternal Aunt      Hypertension Maternal Uncle      CEREBROVASCULAR DISEASE Maternal Grandfather      Breast Cancer Paternal Grandmother      HEART DISEASE Paternal Grandmother      Allergies Sister      seasonal       Social History     Social History     Marital status: Single     Spouse name: N/A     Number of children: 0     Years of education: 14     Occupational History      [employer]     student      SE tech - early childhood     Social History Main Topics     Smoking status: Never Smoker     Smokeless tobacco: Never Used     Alcohol use No     Drug use: No     Sexual activity: Yes     Partners: Male     Birth control/ protection: Pill, Condom       Comment: asked 1/7/13     Other Topics Concern      Service No     Blood Transfusions No     Caffeine Concern No     Occupational Exposure No     Hobby Hazards No     Sleep Concern No     Stress Concern No     Weight Concern No     Special Diet No     Exercise Yes     3-4 days per week, cardio, stationary bike, run, elliptical     Seat Belt Yes     Self-Exams Yes     breast-yes, skin-yes     Social History Narrative       Outpatient Encounter Prescriptions as of 6/6/2018   Medication Sig Dispense Refill     albuterol (PROAIR HFA/PROVENTIL HFA/VENTOLIN HFA) 108 (90 BASE) MCG/ACT Inhaler Inhale 2 puffs into the lungs every 4 hours as needed for shortness of breath / dyspnea May use 2 puffs 10 minutes before exercise. 1 Inhaler 5     amoxicillin (AMOXIL) 500 MG capsule Take one capsule in the morning and one capsule at night 60 capsule 2     fluconazole (DIFLUCAN) 150 MG tablet Take 1 tab (150 mg) once.  If symptoms continue, may take another dose every 3 days if needed. 4 tablet 0     metroNIDAZOLE (FLAGYL) 500 MG tablet Take 1 tablet (500 mg) by mouth 2 times daily 14 tablet 0     norgestimate-ethinyl estradiol (ORTHO-CYCLEN, 28,) 0.25-35 MG-MCG per tablet Take 1 tablet by mouth daily 28 tablet 3     tretinoin (RETIN-A) 0.025 % cream Apply a pea-sized amount to face. Start every 3-4 nights working up to every night. 45 g 3     No facility-administered encounter medications on file as of 6/6/2018.        Review Of Systems:  Skin: As above  Eyes: negative  Ears/Nose/Throat: negative  Respiratory: No shortness of breath, dyspnea on exertion, cough, or hemoptysis  Cardiovascular: negative  Gastrointestinal: negative  Genitourinary: negative  Musculoskeletal: negative  Neurologic: negative  Psychiatric: negative  Hematologic/Lymphatic/Immunologic: negative  Endocrine: negative      Objective:     /79  Pulse 104  LMP 05/10/2018 (Exact Date)  SpO2 97%  Breastfeeding? No  Eyes: Conjunctivae/lids: Normal    ENT: Lips:  Normal  MSK: Normal  Pulm: Breathing Normal  Neuro/Psych: Orientation: Normal; Mood/Affect: Normal, NAD, WDWN  Following areas examined: face, neck, upper chest, back  Findings:    1) cystic acne on cheeks, few inflammatory lesions on face, Light brown/pink smooth macules of face, closed comedones on forehead  2) Well circumscribed macules with symmetric color distribution on mid chest      Assessment and Plan:  1) Acne vulgaris    Morning regimen:    Wash with your clean and clear  Apply a gentle moisturizer*  Take amoxicillin     Evening regimen:    Wash with benzoyl peroxide (Differin makes a 5%)   Apply tretinoin   Apply a gentle moisturizer (do not need sunscreen at night)  Take amoxicillin     *Facial moisturizer (preferably with an SPF of 30 or greater) such as Cetaphil, CeraVe, or Vanicream. Make sure lotion is non-comedogenic. Sunscreen active ingredients: Physical blockers are less likely to clog pores. Examples include titanium dioxide and zinc oxide  Good body moisturizers include Cetaphil, CeraVe, Eucerin, and Vanicream.    Disc Tretinoin at bedtime, dryness, irritation and way to prevent discussed with patient   Benzoyl Peroxide (BPO) wash daily or every other day depending on dryness  (2.5%-5% BPO on face and 5%-10% on chest and back)  Aggressive use of bland emollients discussed with patient   Oral antibiotics: GI upset, esophagitis, and UV precautions discussed with patient     May take 2-3 months to see 50-70% improvement  May get worse during initial phase of treatment    Pathophysiology discussed with patient and information provided.  I discussed with patient oral versus topical treatments such as oral antibiotics, Spironolactone (Aldactone)(women only),oral contraceptive pills (women only) topical creams,  and over-the-counter treatments.     Acne can be effectively treated, although response may sometimes be slow.   Where possible, avoid excessively humid conditions such as a  sauna, working in an unventilated kitchen or tropical vacations.   If you smoke, stop. Nicotine increases sebum retention and increased scale within the follicles, forming comedones (black and whiteheads).    Minimize the application of oils and cosmetics to the affected skin.  Abrasive skin treatments can aggravate acne.   Try not to scratch or pick the spots as this can increase your risk for permanent scarring in the area.   To avoid sunburn, protect your skin outdoors using a broad spectrum (UVB and UVA) sunscreen and protective clothing.  No relationship between particular foods and acne has been proven. However, reports suggest low glycemic and low dairy diet are helpful for some people.    2) Benign nevus on mid chest    I discussed the specifics of tumor, prognosis, and genetics of benign lesions.  I explained that treatment of these lesions would be purely cosmetic and not medically neccessary.  I discussed with patient different removal options including excision, cryotherapy, cautery and /or laser.          Follow up in 2-3 months    Again, thank you for allowing me to participate in the care of your patient.        Sincerely,        Chloe Nolen PA-C

## 2018-06-18 ENCOUNTER — OFFICE VISIT (OUTPATIENT)
Dept: FAMILY MEDICINE | Facility: CLINIC | Age: 28
End: 2018-06-18
Payer: COMMERCIAL

## 2018-06-18 VITALS
HEART RATE: 103 BPM | OXYGEN SATURATION: 98 % | WEIGHT: 119 LBS | BODY MASS INDEX: 19.13 KG/M2 | RESPIRATION RATE: 12 BRPM | HEIGHT: 66 IN | SYSTOLIC BLOOD PRESSURE: 112 MMHG | DIASTOLIC BLOOD PRESSURE: 76 MMHG | TEMPERATURE: 98.4 F

## 2018-06-18 DIAGNOSIS — Z13.1 SCREENING FOR DIABETES MELLITUS: ICD-10-CM

## 2018-06-18 DIAGNOSIS — Z00.00 ENCOUNTER FOR ROUTINE ADULT HEALTH EXAMINATION WITHOUT ABNORMAL FINDINGS: Primary | ICD-10-CM

## 2018-06-18 DIAGNOSIS — Z13.220 LIPID SCREENING: ICD-10-CM

## 2018-06-18 DIAGNOSIS — J45.20 MILD INTERMITTENT ASTHMA WITHOUT COMPLICATION: ICD-10-CM

## 2018-06-18 LAB
CHOLEST SERPL-MCNC: 136 MG/DL
GLUCOSE SERPL-MCNC: 82 MG/DL (ref 70–99)
HDLC SERPL-MCNC: 72 MG/DL
LDLC SERPL CALC-MCNC: 51 MG/DL
NONHDLC SERPL-MCNC: 64 MG/DL
TRIGL SERPL-MCNC: 66 MG/DL

## 2018-06-18 PROCEDURE — 80061 LIPID PANEL: CPT | Performed by: FAMILY MEDICINE

## 2018-06-18 PROCEDURE — 82947 ASSAY GLUCOSE BLOOD QUANT: CPT | Performed by: FAMILY MEDICINE

## 2018-06-18 PROCEDURE — 36415 COLL VENOUS BLD VENIPUNCTURE: CPT | Performed by: FAMILY MEDICINE

## 2018-06-18 PROCEDURE — 99395 PREV VISIT EST AGE 18-39: CPT | Performed by: FAMILY MEDICINE

## 2018-06-18 RX ORDER — VALACYCLOVIR HYDROCHLORIDE 1 G/1
TABLET, FILM COATED ORAL
Refills: 6 | COMMUNITY
Start: 2018-03-29 | End: 2019-03-26

## 2018-06-18 NOTE — MR AVS SNAPSHOT
After Visit Summary   6/18/2018    Rachel Rhoades    MRN: [id number]           Patient Information     Date Of Birth          1990        Visit Information        Provider Department      6/18/2018 9:10 AM Suly Montalvo,  [facility]        Today's Diagnoses     Screening for diabetes mellitus    -  1    Lipid screening          Care Instructions      Preventive Health Recommendations  Female Ages 18 to 25     Yearly exam:     See your health care provider every year in order to  o Review health changes.   o Discuss preventive care.    o Review your medicines if your doctor has prescribed any.      You should be tested each year for STDs (sexually transmitted diseases).       After age 20, talk to your provider about how often you should have cholesterol testing.      Starting at age 21, get a Pap test every three years. If you have an abnormal result, your doctor may have you test more often.      If you are at risk for diabetes, you should have a diabetes test (fasting glucose).     Shots:     Get a flu shot each year.     Get a tetanus shot every 10 years.     Consider getting the shot (vaccine) that prevents cervical cancer (Gardasil).    Nutrition:     Eat at least 5 servings of fruits and vegetables each day.    Eat whole-grain bread, whole-wheat pasta and brown rice instead of white grains and rice.    Talk to your provider about Calcium and Vitamin D.     Lifestyle    Exercise at least 150 minutes a week each week (30 minutes a day, 5 days a week). This will help you control your weight and prevent disease.    Limit alcohol to one drink per day.    No smoking.     Wear sunscreen to prevent skin cancer.    See your dentist every six months for an exam and cleaning.          Follow-ups after your visit        Follow-up notes from your care team     Return for Routine Visit.      Who to contact     If you have questions or need follow up information  "about today's clinic visit or your schedule please contact [facility] directly at [phone].  Normal or non-critical lab and imaging results will be communicated to you by MyChart, letter or phone within 4 business days after the clinic has received the results. If you do not hear from us within 7 days, please contact the clinic through V[employer]cher[employer] or phone. If you have a critical or abnormal lab result, we will notify you by phone as soon as possible.  Submit refill requests through [email or web address] or call your pharmacy and they will forward the refill request to us. Please allow 3 business days for your refill to be completed.          Additional Information About Your Visit        [email or web address] Information     [email or web address] gives you secure access to your electronic health record. If you see a primary care provider, you can also send messages to your care team and make appointments. If you have questions, please call your primary care clinic.  If you do not have a primary care provider, please call [phone] and they will assist you.        Care EveryWhere ID     This is your Care EveryWhere ID. This could be used by other organizations to access your [facility] medical records  [id number]        Your Vitals Were     Pulse Temperature Respirations Height Last Period Pulse Oximetry    103 98.4  F (36.9  C) (Tympanic) 12 5' 5.5\" (1.664 m) 06/17/2018 98%    Breastfeeding? BMI (Body Mass Index)                No 19.5 kg/m2           Blood Pressure from Last 3 Encounters:   06/18/18 112/76   06/06/18 126/79   03/01/18 108/72    Weight from Last 3 Encounters:   06/18/18 119 lb (54 kg)   03/01/18 121 lb (54.9 kg)   01/22/18 118 lb 4.8 oz (53.7 kg)              We Performed the Following     Glucose     Lipid Profile (Chol, Trig, HDL, LDL calc)        Primary Care Provider Office Phone # Fax #    Suly Montalvo [phone]       [address] [facility] [address]  [facility] [address]      "   Equal Access to Services     [facility] : Hadii aad ku hadedwinaphyllis Callum, wabhargavida luqkelly, qascottyta katitussilviano taveras. So [facility] [phone].    ATENCIÓN: Si habla español, tiene a yañez disposición servicios gratuitos de asistencia lingüística. Jodi al [phone].    We comply with applicable federal civil rights laws and [place] laws. We do not discriminate on the basis of race, color, national origin, age, disability, sex, sexual orientation, or gender identity.            Thank you!     Thank you for choosing [facility]  for your care. Our goal is always to provide you with excellent care. Hearing back from our patients is one way we can continue to improve our services. Please take a few minutes to complete the written survey that you may receive in the mail after your visit with us. Thank you!             Your Updated Medication List - Protect others around you: Learn how to safely use, store and throw away your medicines at www.disposemymeds.org.          This list is accurate as of 6/18/18  9:29 AM.  Always use your most recent med list.                   Brand Name Dispense Instructions for use Diagnosis    albuterol 108 (90 Base) MCG/ACT Inhaler    PROAIR HFA/PROVENTIL HFA/VENTOLIN HFA    1 Inhaler    Inhale 2 puffs into the lungs every 4 hours as needed for shortness of breath / dyspnea May use 2 puffs 10 minutes before exercise.    Mild persistent asthma, unspecified whether complicated       amoxicillin 500 MG capsule    AMOXIL    60 capsule    Take one capsule in the morning and one capsule at night    Acne vulgaris       norgestimate-ethinyl estradiol 0.25-35 MG-MCG per tablet    ORTHO-CYCLEN (28)    28 tablet    Take 1 tablet by mouth daily    Encounter for surveillance of contraceptive pills       tretinoin 0.025 % cream    RETIN-A    45 g    Apply a pea-sized amount to face. Start every 3-4 nights working up to every night.     Acne vulgaris, Acne scarring, Post-inflammatory hyperpigmentation       valACYclovir 1000 mg tablet    VALTREX     FOR ORAL OUTBREAK TK 2 TS PO BID FOR 1 DAY FOR GENITAL OUTBREAK TK 1 T D FOR 5 DAYS

## 2018-06-18 NOTE — LETTER
My Asthma Action Plan  Name: Rachel Rhoades   YOB: 1990  Date: 6/18/2018   My doctor: Suly Montalvo, DO   My clinic: [facility]        My Control Medicine: None  My Rescue Medicine: Albuterol (Proair/Ventolin/Proventil) inhaler 1-2 puffs every 4-6 hours as needed for cough/wheezing   My Asthma Severity: intermittent  Avoid your asthma triggers: upper respiratory infections and exercise or sports               GREEN ZONE   Good Control    I feel good    No cough or wheeze    Can work, sleep and play without asthma symptoms       Take your asthma control medicine every day.     1. If exercise triggers your asthma, take your rescue medication    15 minutes before exercise or sports, and    During exercise if you have asthma symptoms  2. Spacer to use with inhaler: If you have a spacer, make sure to use it with your inhaler             YELLOW ZONE Getting Worse  I have ANY of these:    I do not feel good    Cough or wheeze    Chest feels tight    Wake up at night   1. Keep taking your Green Zone medications  2. Start taking your rescue medicine:    every 20 minutes for up to 1 hour. Then every 4 hours for 24-48 hours.  3. If you stay in the Yellow Zone for more than 12-24 hours, contact your doctor.  4. If you do not return to the Green Zone in 12-24 hours or you get worse, start taking your oral steroid medicine if prescribed by your provider.           RED ZONE Medical Alert - Get Help  I have ANY of these:    I feel awful    Medicine is not helping    Breathing getting harder    Trouble walking or talking    Nose opens wide to breathe       1. Take your rescue medicine NOW  2. If your provider has prescribed an oral steroid medicine, start taking it NOW  3. Call your doctor NOW  4. If you are still in the Red Zone after 20 minutes and you have not reached your doctor:    Take your rescue medicine again and    Call 911 or go to the emergency room right away    See  your regular doctor within 2 weeks of an Emergency Room or Urgent Care visit for follow-up treatment.          Annual Reminders:  Meet with Asthma Educator,  Flu Shot in the Fall, consider Pneumonia Vaccination for patients with asthma (aged 19 and older).    Pharmacy:    [facility] [facility] , [place]  [facility] [id number] [place] [address] [address] AT [facility] [address]                      Asthma Triggers  How To Control Things That Make Your Asthma Worse    Triggers are things that make your asthma worse.  Look at the list below to help you find your triggers and what you can do about them.  You can help prevent asthma flare-ups by staying away from your triggers.      Trigger                                                          What you can do   Cigarette Smoke  Tobacco smoke can make asthma worse. Do not allow smoking in your home, car or around you.  Be sure no one smokes at a child s day care or school.  If you smoke, ask your health care provider for ways to help you quit.  Ask family members to quit too.  Ask your health care provider for a referral to Quit Plan to help you quit smoking, or call 3-331-722-PLAN.     Colds, Flu, Bronchitis  These are common triggers of asthma. Wash your hands often.  Don t touch your eyes, nose or mouth.  Get a flu shot every year.     Dust Mites  These are tiny bugs that live in cloth or carpet. They are too small to see. Wash sheets and blankets in hot water every week.   Encase pillows and mattress in dust mite proof covers.  Avoid having carpet if you can. If you have carpet, vacuum weekly.   Use a dust mask and HEPA vacuum.   Pollen and Outdoor Mold  Some people are allergic to trees, grass, or weed pollen, or molds. Try to keep your windows closed.  Limit time out doors when pollen count is high.   Ask you health care provider about taking medicine during allergy season.     Animal Dander  Some people are allergic to skin flakes, urine or  saliva from pets with fur or feathers. Keep pets with fur or feathers out of your home.    If you can t keep the pet outdoors, then keep the pet out of your bedroom.  Keep the bedroom door closed.  Keep pets off cloth furniture and away from stuffed toys.     Mice, Rats, and Cockroaches  Some people are allergic to the waste from these pests.   Cover food and garbage.  Clean up spills and food crumbs.  Store grease in the refrigerator.   Keep food out of the bedroom.   Indoor Mold  This can be a trigger if your home has high moisture. Fix leaking faucets, pipes, or other sources of water.   Clean moldy surfaces.  Dehumidify basement if it is damp and smelly.   Smoke, Strong Odors, and Sprays  These can reduce air quality. Stay away from strong odors and sprays, such as perfume, powder, hair spray, paints, smoke incense, paint, cleaning products, candles and new carpet.   Exercise or Sports  Some people with asthma have this trigger. Be active!  Ask your doctor about taking medicine before sports or exercise to prevent symptoms.    Warm up for 5-10 minutes before and after sports or exercise.     Other Triggers of Asthma  Cold air:  Cover your nose and mouth with a scarf.  Sometimes laughing or crying can be a trigger.  Some medicines and food can trigger asthma.

## 2018-06-18 NOTE — PROGRESS NOTES
SUBJECTIVE:   CC: Rachel Rhoades is an 27 year old woman who presents for preventive health visit.     Physical   Annual:     Getting at least 3 servings of Calcium per day::  Yes    Bi-annual eye exam::  Yes    Dental care twice a year::  Yes    Sleep apnea or symptoms of sleep apnea::  None    Diet::  Regular (no restrictions)    Frequency of exercise::  4-5 days/week    Duration of exercise::  Greater than 60 minutes    Taking medications regularly::  Yes    Medication side effects::  None    Additional concerns today::  No              Today's PHQ-2 Score:   PHQ-2 ( 1999 Pfizer) 6/17/2018   Q1: Little interest or pleasure in doing things 0   Q2: Feeling down, depressed or hopeless 0   PHQ-2 Score 0   Q1: Little interest or pleasure in doing things Not at all   Q2: Feeling down, depressed or hopeless Not at all   PHQ-2 Score 0       Abuse: Current or Past(Physical, Sexual or Emotional)- No  Do you feel safe in your environment - Yes    Social History   Substance Use Topics     Smoking status: Never Smoker     Smokeless tobacco: Never Used     Alcohol use No     Alcohol Use 6/17/2018   If you drink alcohol do you typically have greater than 3 drinks per day OR greater than 7 drinks per week? No       Reviewed orders with patient.  Reviewed health maintenance and updated orders accordingly - Yes  Labs reviewed in EPIC    Mammogram not appropriate for this patient based on age.    Pertinent mammograms are reviewed under the imaging tab.  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     Homar-Parkinson-White Syndrome  Dr Zapata  out of [facility]      "Asthma     exercise induced      Past Surgical History:   Procedure Laterality Date     C ABLATION SUPRAVENT ARRHYTHMOGENIC FOCUS  2007    radiofrequency cardiac ablation for WPW     Obstetric History       T0      L0     SAB0   TAB0   Ectopic0   Multiple0   Live Births0           Review of Systems  CONSTITUTIONAL: NEGATIVE for fever, chills, change in weight  INTEGUMENTARU/SKIN: NEGATIVE for worrisome rashes, moles or lesions  EYES: NEGATIVE for vision changes or irritation  ENT: NEGATIVE for ear, mouth and throat problems  RESP: NEGATIVE for significant cough or SOB  BREAST: NEGATIVE for masses, tenderness or discharge  CV: NEGATIVE for chest pain, palpitations or peripheral edema  GI: NEGATIVE for nausea, abdominal pain, heartburn, or change in bowel habits  : NEGATIVE for unusual urinary or vaginal symptoms. Periods are regular.  MUSCULOSKELETAL: NEGATIVE for significant arthralgias or myalgia  NEURO: NEGATIVE for weakness, dizziness or paresthesias  HEME/ALLERGY/IMMUNE: NEGATIVE for bleeding problems  PSYCHIATRIC: NEGATIVE for changes in mood or affect     OBJECTIVE:   /76 (BP Location: Right arm, Patient Position: Sitting, Cuff Size: Adult Regular)  Pulse 103  Temp 98.4  F (36.9  C) (Tympanic)  Resp 12  Ht 5' 5.5\" (1.664 m)  Wt 119 lb (54 kg)  LMP 2018  SpO2 98%  Breastfeeding? No  BMI 19.5 kg/m2  Physical Exam  GENERAL: healthy, alert and no distress  EYES: Eyes grossly normal to inspection, PERRL and conjunctivae and sclerae normal  HENT: ear canals and TM's normal, nose and mouth without ulcers or lesions  NECK: no adenopathy, no asymmetry, masses, or scars and thyroid normal to palpation  RESP: lungs clear to auscultation - no rales, rhonchi or wheezes  BREAST: deferred per pt request  CV: regular rate and rhythm, normal S1 S2, no S3 or S4, no murmur, click or rub, no peripheral edema and peripheral pulses strong  ABDOMEN: soft, nontender, no hepatosplenomegaly, no " "masses and bowel sounds normal   (female): deferred per pt request  MS: no gross musculoskeletal defects noted, no edema  SKIN: no suspicious lesions or rashes  NEURO: Normal strength and tone, mentation intact and speech normal  PSYCH: mentation appears normal, affect normal/bright    ASSESSMENT/PLAN:   Rachel was seen today for physical.    Diagnoses and all orders for this visit:    Encounter for routine adult health examination without abnormal findings    Screening for diabetes mellitus  -     Glucose    Lipid screening  -     Lipid Profile (Chol, Trig, HDL, LDL calc)    Mild intermittent asthma without complication    Other orders  -     Cancel: HIV Screening      Follow-up recommended for yearly preventive exams or sooner for an acute issues.    ACT/AAP completed.    COUNSELING:  Reviewed preventive health counseling, as reflected in patient instructions  Special attention given to:        Regular exercise       Healthy diet/nutrition       Vision screening       Hearing screening       HIV screeninx in teen years, 1x in adult years, and at intervals if high risk         reports that she has never smoked. She has never used smokeless tobacco.    Estimated body mass index is 19.5 kg/(m^2) as calculated from the following:    Height as of this encounter: 5' 5.5\" (1.664 m).    Weight as of this encounter: 119 lb (54 kg).       Counseling Resources:  ATP IV Guidelines  Pooled Cohorts Equation Calculator  Breast Cancer Risk Calculator  FRAX Risk Assessment  ICSI Preventive Guidelines  Dietary Guidelines for Americans,   USDA's MyPlate  ASA Prophylaxis  Lung CA Screening    Suly Montalvo, [facility]  Answers for HPI/ROS submitted by the patient on 2018   PHQ-2 Score: 0    "

## 2018-06-19 ASSESSMENT — ASTHMA QUESTIONNAIRES: ACT_TOTALSCORE: 22

## 2019-03-26 ENCOUNTER — MYC MEDICAL ADVICE (OUTPATIENT)
Dept: FAMILY MEDICINE | Facility: CLINIC | Age: 29
End: 2019-03-26

## 2019-03-26 DIAGNOSIS — B00.9 HERPES SIMPLEX VIRUS INFECTION: Primary | ICD-10-CM

## 2019-03-26 RX ORDER — VALACYCLOVIR HYDROCHLORIDE 1 G/1
TABLET, FILM COATED ORAL
Qty: 30 TABLET | Refills: 0 | Status: SHIPPED | OUTPATIENT
Start: 2019-03-26 | End: 2019-11-18

## 2019-03-27 ENCOUNTER — OFFICE VISIT (OUTPATIENT)
Dept: FAMILY MEDICINE | Facility: CLINIC | Age: 29
End: 2019-03-27
Payer: COMMERCIAL

## 2019-03-27 VITALS
RESPIRATION RATE: 14 BRPM | HEIGHT: 64 IN | TEMPERATURE: 99.1 F | WEIGHT: 125.5 LBS | DIASTOLIC BLOOD PRESSURE: 62 MMHG | HEART RATE: 88 BPM | SYSTOLIC BLOOD PRESSURE: 104 MMHG | OXYGEN SATURATION: 98 % | BODY MASS INDEX: 21.43 KG/M2

## 2019-03-27 DIAGNOSIS — J45.20 MILD INTERMITTENT ASTHMA WITHOUT COMPLICATION: Primary | ICD-10-CM

## 2019-03-27 DIAGNOSIS — Z30.41 ENCOUNTER FOR SURVEILLANCE OF CONTRACEPTIVE PILLS: ICD-10-CM

## 2019-03-27 PROCEDURE — 99214 OFFICE O/P EST MOD 30 MIN: CPT | Performed by: FAMILY MEDICINE

## 2019-03-27 RX ORDER — ALBUTEROL SULFATE 90 UG/1
1-2 AEROSOL, METERED RESPIRATORY (INHALATION) EVERY 4 HOURS PRN
Qty: 6.7 G | Refills: 3 | Status: SHIPPED | OUTPATIENT
Start: 2019-03-27 | End: 2019-07-18

## 2019-03-27 RX ORDER — NORGESTIMATE AND ETHINYL ESTRADIOL 0.25-0.035
1 KIT ORAL DAILY
Qty: 28 TABLET | Refills: 11 | Status: SHIPPED | OUTPATIENT
Start: 2019-03-27

## 2019-03-27 ASSESSMENT — MIFFLIN-ST. JEOR: SCORE: 1294.26

## 2019-03-27 NOTE — PROGRESS NOTES
SUBJECTIVE:   Rachel Rhoades is a 28 year old female who presents to clinic today for the following health issues:      Asthma Follow-Up    Was ACT completed today?    Yes    ACT Total Scores 3/27/2019   ACT TOTAL SCORE -   ASTHMA ER VISITS -   ASTHMA HOSPITALIZATIONS -   ACT TOTAL SCORE (Goal Greater than or Equal to 20) 21   In the past 12 months, how many times did you visit the emergency room for your asthma without being admitted to the hospital? 0   In the past 12 months, how many times were you hospitalized overnight because of your asthma? 0       Recent asthma triggers that patient is dealing with: None    Amount of exercise or physical activity: 4-5 days/week for an average of 30-45 minutes    Problems taking medications regularly: No    Medication side effects: none    Diet: regular (no restrictions)    Asthma very well controlled.    Will be getting  in 1 year and plans to have children in the next 1-2 years.  Her current OCP is working well for her.        Problem list and histories reviewed & adjusted, as indicated.  Additional history: as documented    Labs reviewed in EPIC    Reviewed and updated as needed this visit by clinical staff  Tobacco  Allergies  Meds  Problems  Med Hx  Surg Hx  Fam Hx       Reviewed and updated as needed this visit by Provider  Tobacco  Allergies  Meds  Problems  Med Hx  Surg Hx  Fam Hx         ROS:  CONSTITUTIONAL: NEGATIVE for fever, chills, change in weight  INTEGUMENTARY/SKIN: NEGATIVE for worrisome rashes, moles or lesions  EYES: NEGATIVE for vision changes or irritation  ENT/MOUTH: NEGATIVE for ear, mouth and throat problems  RESP: NEGATIVE for significant cough or SOB  CV: NEGATIVE for chest pain, palpitations or peripheral edema  GI: NEGATIVE for nausea, abdominal pain, heartburn, or change in bowel habits  : NEGATIVE for frequency, dysuria, or hematuria  MUSCULOSKELETAL: NEGATIVE for significant arthralgias or myalgia  NEURO: NEGATIVE for  "weakness, dizziness or paresthesias  HEME: NEGATIVE for bleeding problems  PSYCHIATRIC: NEGATIVE for changes in mood or affect    OBJECTIVE:     /62 (Patient Position: Sitting, Cuff Size: Adult Regular)   Pulse 88   Temp 99.1  F (37.3  C) (Tympanic)   Resp 14   Ht 1.626 m (5' 4\")   Wt 56.9 kg (125 lb 8 oz)   LMP 02/25/2019   SpO2 98%   Breastfeeding? No   BMI 21.54 kg/m    Body mass index is 21.54 kg/m .   GENERAL: healthy, alert and no distress  EYES: Eyes grossly normal to inspection, PERRL and conjunctivae and sclerae normal  HENT: ear canals and TM's normal, nose and mouth without ulcers or lesions  NECK: no adenopathy, no asymmetry, masses, or scars and thyroid normal to palpation  RESP: lungs clear to auscultation - no rales, rhonchi or wheezes  CV: regular rate and rhythm, normal S1 S2, no S3 or S4, no murmur, click or rub, no peripheral edema and peripheral pulses strong  ABDOMEN: soft, nontender, no hepatosplenomegaly, no masses and bowel sounds normal  MS: no gross musculoskeletal defects noted, no edema  SKIN: no suspicious lesions or rashes  NEURO: Normal strength and tone, mentation intact and speech normal  PSYCH: mentation appears normal, affect normal/bright    Diagnostic Test Results:  none     ASSESSMENT/PLAN:     Problem List Items Addressed This Visit     Mild intermittent asthma - Primary    Relevant Medications    albuterol (PROAIR HFA/PROVENTIL HFA/VENTOLIN HFA) 108 (90 Base) MCG/ACT inhaler    Encounter for surveillance of contraceptive pills    Relevant Medications    norgestimate-ethinyl estradiol (ORTHO-CYCLEN, 28,) 0.25-35 MG-MCG tablet           --ACT re-scored, asthma very well controlled.    Refilled Albuterol  RTC in 6 months for med check, or may do ACT score without an OV as long as she is still well controlled.  --Refilled OCP's, next pap smear due in 2021.    Suly Montalvo, DO  [facility]  "

## 2019-03-28 ASSESSMENT — ASTHMA QUESTIONNAIRES: ACT_TOTALSCORE: 21

## 2019-04-30 ENCOUNTER — E-VISIT (OUTPATIENT)
Dept: FAMILY MEDICINE | Facility: CLINIC | Age: 29
End: 2019-04-30
Payer: COMMERCIAL

## 2019-04-30 DIAGNOSIS — G89.29 CHRONIC KNEE PAIN, UNSPECIFIED LATERALITY: Primary | ICD-10-CM

## 2019-04-30 DIAGNOSIS — M25.569 CHRONIC KNEE PAIN, UNSPECIFIED LATERALITY: Primary | ICD-10-CM

## 2019-04-30 PROCEDURE — 99444 ZZC PHYSICIAN ONLINE EVALUATION & MANAGEMENT SERVICE: CPT | Performed by: FAMILY MEDICINE

## 2019-05-01 NOTE — TELEPHONE ENCOUNTER
"Hi Rachel,    Knee pain is very commonly caused by overuse injuries.  So the way to treat it is by resting the knee.    We often recommend \"RICE\" therapy...  Rest  Ice  Compression  and Elevation    Your want to rest the knee for at least 2 weeks.  Bt you still want to continue light/easy stretches to keep your knee from becoming too stiff.    Applying cold (ice) compresses help to decrease any swelling and minimize pain.  Scheduling Ibuprofen at the anti-inflammatory dose of 600-800 mg every 6-8 hours for the next 1-2 weeks (while alternating with Tylenol) will also help calm down the inflammatory response to your knee pain.    Compression with an ace-wrap helps to add some comfort and stability to the knee as it heals.    Elevating the knee also helps to decrease the swelling to the knee.    Since this has been about a month (or more) of the pain, working with a physical therapist is typically quite helpful at this point.  If you wish to pursue PT, here is the information on how to set up an appointment:    Physical Therapy, Hand Therapy and Chiropractic Care are available through:  *[facility]  *Hand Therapy (Occupational Therapy or Physical Therapy)  *[facility]    Call one number to schedule at any of the above locations: [phone].    Physical therapy, Hand therapy and/or Chiropractic care has been recommended by your physician as an excellent treatment option to reduce pain and help people return to normal activities, including sports.  Therapy and/or chiropractic care services are a great complement or alternative to expensive and invasive surgery, injections, or long-term use of prescription medications. The primary goal is to identify the underlying problem and provide you the tools to manage your condition on your own.     Please be aware that coverage of these services is subject to the terms and limitations of your health insurance plan.  Call member " services at your health plan with any benefit or coverage questions.      Please bring the following to your appointment:  *Your personal calendar for scheduling future appointments  *Comfortable clothing      If RICE Therapy, scheduling Ibuprofen, and PT have not been helpful or you have made some progress but not as much as one would expect, then I would recommend coming in.  Also, if you have a job that requires you to be very physically active, please let me know and I would be happy to write you a letter with restrictions for a couple of weeks.    I hope that is helpful!  Please let me know if you have any questions or concerns.    --Suly Montalvo, DO

## 2019-06-27 ENCOUNTER — OFFICE VISIT (OUTPATIENT)
Dept: FAMILY MEDICINE | Facility: CLINIC | Age: 29
End: 2019-06-27
Payer: COMMERCIAL

## 2019-06-27 VITALS — SYSTOLIC BLOOD PRESSURE: 106 MMHG | DIASTOLIC BLOOD PRESSURE: 72 MMHG

## 2019-06-27 DIAGNOSIS — Z51.81 ENCOUNTER FOR THERAPEUTIC DRUG MONITORING: ICD-10-CM

## 2019-06-27 DIAGNOSIS — L70.0 NODULOCYSTIC ACNE: Primary | ICD-10-CM

## 2019-06-27 DIAGNOSIS — L70.0 ACNE VULGARIS: ICD-10-CM

## 2019-06-27 PROCEDURE — 99213 OFFICE O/P EST LOW 20 MIN: CPT | Performed by: FAMILY MEDICINE

## 2019-06-27 RX ORDER — SPIRONOLACTONE 50 MG/1
50 TABLET, FILM COATED ORAL DAILY
Qty: 90 TABLET | Refills: 0 | Status: SHIPPED | OUTPATIENT
Start: 2019-06-27 | End: 2019-10-01

## 2019-06-27 NOTE — PATIENT INSTRUCTIONS
FUTURE APPOINTMENTS  Follow up in 2-3 month(s).    ORAL MEDICATION  First two weeks: Take by mouth 0.5 capsule(s)/tablet(s) of spironolactone 50 mg once daily.  After two weeks: Take by mouth 1 capsule(s)/tablet(s) of spironolactone 50 mg once daily.    ACNE TREATMENT PLAN  Continue current cleansers.    Bedtime :    Lastly, before going to bed, apply topical Differin 0.1% gel.    Wait until face is dry after cleansing before application.    Use just a pea-sized amount for application.      BCP - Continue taking your Ortho Tri Cyclen.    Recommendations if skin becomes too dry in response to medication:    Use Cetaphil facial moisturizer.    Alternate application of Adapalene every other night for 2 weeks, to condition the skin. After 2 weeks, retry nightly application.

## 2019-06-27 NOTE — PROGRESS NOTES
[facility] - PRIMARY CARE SKIN    CC: Acne  SUBJECTIVE:   Rachel Rhoades is a(n) 28 year old female who presents to clinic today because of acne.    Symptoms have been ongoing for: years.  The acne is primarily located on the: face.  Acne generally presents as: cystic acne has been more bothersome in the past couple of years. She did not have acne in adolescence.    Current treatment: Currently on ortho tri-cyclen. Clean and clear cleansers used.    Previous treatments include: intralesional triamcinolone, amoxicillin, tretinoin, benzoyl peroxide.    Skin type: oily/dry combination .  Family history of acne: YES - cystic acne in father.  Risk factors for acne: summer, sports involvement.    She is planning on getting  next year.    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    Refer to electronic medical record (EMR) for past medical history and medications.    INTEGUMENTARY/SKIN: POSITIVE for acne  ROS: 14 point review of systems was negative except the symptoms listed above in the HPI.    This document serves as a record of the services and decisions personally performed and made by Tika Jama MD and was created by Carlos Nassar, a trained medical scribe, based on personal observations and provider statements to the medical scribe.  June 27, 2019 3:13 PM   Carlos Nassar    OBJECTIVE:   GENERAL: healthy, alert and no distress.  SKIN: Roy Skin Type - III.  Face examined. The dermatoscope was used to help evaluate pigmented lesions.  Skin Pertinent Findings:  Face: Resolving cystic nodule on left cheek. residual scarring. few inflammatory papules on upper forehead. Moderate scarring.    Diagnostic Test Results:  none     ASSESSMENT:     Encounter Diagnoses   Name Primary?     Nodulocystic acne Yes     Acne vulgaris      Encounter for therapeutic drug monitoring      MDM: . Discussed pathophysiology of acne,  treatment options, and expectations for treatment response.    PLAN:   Patient Instructions   FUTURE APPOINTMENTS  Follow up in 2-3 month(s).    ORAL MEDICATION  First two weeks: Take by mouth 0.5 capsule(s)/tablet(s) of spironolactone 50 mg once daily.  After two weeks: Take by mouth 1 capsule(s)/tablet(s) of spironolactone 50 mg once daily.    ACNE TREATMENT PLAN  Continue current cleansers.    Bedtime :    Lastly, before going to bed, apply topical Differin 0.1% gel.    Wait until face is dry after cleansing before application.    Use just a pea-sized amount for application.      BCP - Continue taking your Ortho Tri Cyclen.    Recommendations if skin becomes too dry in response to medication:    Use Cetaphil facial moisturizer.    Alternate application of Adapalene every other night for 2 weeks, to condition the skin. After 2 weeks, retry nightly application.        TT: 20 minutes.  CT: 15 minutes.    The information in this document, created by the medical scribe for me, accurately reflects the services I personally performed and the decisions made by me. I have reviewed and approved this document for accuracy prior to leaving the patient care area.  June 27, 2019 3:13 PM  Tika Jama MD  [facility] [place]

## 2019-07-15 ENCOUNTER — OFFICE VISIT (OUTPATIENT)
Dept: MIDWIFE SERVICES | Facility: CLINIC | Age: 29
End: 2019-07-15
Payer: COMMERCIAL

## 2019-07-15 VITALS
HEART RATE: 56 BPM | DIASTOLIC BLOOD PRESSURE: 58 MMHG | SYSTOLIC BLOOD PRESSURE: 110 MMHG | BODY MASS INDEX: 20.66 KG/M2 | WEIGHT: 121 LBS | HEIGHT: 64 IN

## 2019-07-15 DIAGNOSIS — R30.0 DYSURIA: Primary | ICD-10-CM

## 2019-07-15 DIAGNOSIS — N89.8 VAGINAL ODOR: ICD-10-CM

## 2019-07-15 DIAGNOSIS — N89.8 VAGINAL DISCHARGE: ICD-10-CM

## 2019-07-15 LAB
ALBUMIN UR-MCNC: NEGATIVE MG/DL
APPEARANCE UR: CLEAR
BILIRUB UR QL STRIP: NEGATIVE
COLOR UR AUTO: YELLOW
GLUCOSE UR STRIP-MCNC: NEGATIVE MG/DL
HGB UR QL STRIP: NEGATIVE
KETONES UR STRIP-MCNC: NEGATIVE MG/DL
LEUKOCYTE ESTERASE UR QL STRIP: NEGATIVE
NITRATE UR QL: NEGATIVE
PH UR STRIP: 6.5 PH (ref 5–7)
RBC #/AREA URNS AUTO: NORMAL /HPF
SOURCE: NORMAL
SP GR UR STRIP: 1.01 (ref 1–1.03)
SPECIMEN SOURCE: ABNORMAL
UROBILINOGEN UR STRIP-ACNC: 0.2 EU/DL (ref 0.2–1)
WBC #/AREA URNS AUTO: NORMAL /HPF
WET PREP SPEC: ABNORMAL

## 2019-07-15 PROCEDURE — 99203 OFFICE O/P NEW LOW 30 MIN: CPT | Performed by: ADVANCED PRACTICE MIDWIFE

## 2019-07-15 PROCEDURE — 87210 SMEAR WET MOUNT SALINE/INK: CPT | Performed by: ADVANCED PRACTICE MIDWIFE

## 2019-07-15 PROCEDURE — 81001 URINALYSIS AUTO W/SCOPE: CPT | Performed by: ADVANCED PRACTICE MIDWIFE

## 2019-07-15 RX ORDER — METRONIDAZOLE 500 MG/1
500 TABLET ORAL 2 TIMES DAILY
Qty: 14 TABLET | Refills: 0 | Status: SHIPPED | OUTPATIENT
Start: 2019-07-15 | End: 2019-10-22

## 2019-07-15 SDOH — HEALTH STABILITY: MENTAL HEALTH: HOW OFTEN DO YOU HAVE A DRINK CONTAINING ALCOHOL?: 2-3 TIMES A WEEK

## 2019-07-15 SDOH — HEALTH STABILITY: MENTAL HEALTH: HOW MANY STANDARD DRINKS CONTAINING ALCOHOL DO YOU HAVE ON A TYPICAL DAY?: 1 OR 2

## 2019-07-15 ASSESSMENT — ANXIETY QUESTIONNAIRES
IF YOU CHECKED OFF ANY PROBLEMS ON THIS QUESTIONNAIRE, HOW DIFFICULT HAVE THESE PROBLEMS MADE IT FOR YOU TO DO YOUR WORK, TAKE CARE OF THINGS AT HOME, OR GET ALONG WITH OTHER PEOPLE: NOT DIFFICULT AT ALL
1. FEELING NERVOUS, ANXIOUS, OR ON EDGE: NOT AT ALL
6. BECOMING EASILY ANNOYED OR IRRITABLE: NOT AT ALL
3. WORRYING TOO MUCH ABOUT DIFFERENT THINGS: NOT AT ALL
2. NOT BEING ABLE TO STOP OR CONTROL WORRYING: NOT AT ALL
GAD7 TOTAL SCORE: 0
5. BEING SO RESTLESS THAT IT IS HARD TO SIT STILL: NOT AT ALL
7. FEELING AFRAID AS IF SOMETHING AWFUL MIGHT HAPPEN: NOT AT ALL

## 2019-07-15 ASSESSMENT — PATIENT HEALTH QUESTIONNAIRE - PHQ9
SUM OF ALL RESPONSES TO PHQ QUESTIONS 1-9: 0
5. POOR APPETITE OR OVEREATING: NOT AT ALL

## 2019-07-15 ASSESSMENT — MIFFLIN-ST. JEOR: SCORE: 1273.85

## 2019-07-15 NOTE — PROGRESS NOTES
SUBJECTIVE:   Rachel is a 28 year old here for vaginal symptoms: has been treated for both BV and UTI in past, unsure exactly what is going on                  Vaginal Symptoms     Onset: a few days    Description:  Vaginal Discharge: clear  Itching (Pruritis): No  Burning sensation:  Yes  Odor:  Yes: strange  Irritation:  Yes    Accompanying Signs & Symptoms:  Pain with Urination: Yes  Abdominal Pain:  No  Fever: No   History:   Sexually active:  Yes  New Partner:  No  Possibility of Pregnancy:  No  Contraceptive type: oral contraceptive    Precipitating factors:   Recent Antibiotic Use: No    Alleviating factors:     Therapies Tried and outcome:   Previous Episodes of Vaginitis:  Yes: history of BV  Tried crabnberry juice and increasing hydration      Other associated symptoms: none.  History of STI's:  No  STI Testing offered: Yes, Declined    LMP: Patient's last menstrual period was 06/15/2019.      Patient Active Problem List   Diagnosis     Heart valve problem     Ovarian cystic mass     Mild intermittent asthma     Anomalous atrioventricular excitation     Encounter for surveillance of contraceptive pills     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     Homar-Parkinson-White Syndrome  Dr Zapata  out of [facility]     Asthma     exercise induced     Past Surgical History:   Procedure Laterality Date     C ABLATION SUPRAVENT ARRHYTHMOGENIC FOCUS  8/2007    radiofrequency cardiac ablation for WPW     Current Outpatient Medications   Medication Sig Dispense Refill     albuterol (PROAIR HFA/PROVENTIL HFA/VENTOLIN HFA) 108 (90 Base) MCG/ACT inhaler Inhale 1-2 puffs into the lungs every 4 hours as needed for wheezing May use 2 puffs 10 minutes before exercise. 6.7 g 3     metroNIDAZOLE (FLAGYL) 500 MG tablet Take 1 tablet (500 mg) by mouth 2 times daily for 7 days 14 tablet 0     norgestimate-ethinyl estradiol (ORTHO-CYCLEN, 28,) 0.25-35 MG-MCG tablet Take 1  "tablet by mouth daily 28 tablet 11     spironolactone (ALDACTONE) 50 MG tablet Take 1 tablet (50 mg) by mouth daily 90 tablet 0     valACYclovir (VALTREX) 1000 mg tablet FOR ORAL OUTBREAK take 2 tablets by mouth twice daily for 1 day. FOR GENITAL OUTBREAK take 1 tablet for 5 days. 30 tablet 0     No Known Allergies    Health maintenance updated:  yes    ROS:   12 point review of systems negative other than symptoms noted below.  Genitourinary: Painful Urination, Vaginal Discharge and vaginal odor    PHYSICAL EXAM:    /58   Pulse 56   Ht 1.626 m (5' 4\")   Wt 54.9 kg (121 lb)   LMP 06/15/2019   BMI 20.77 kg/m  , Body mass index is 20.77 kg/m .  General appearance:  healthy, alert and no distress  Pelvic Exam:  Vulva: No lesions, no adenopathy, BUS:  wnl.   Vagina: Moist, pink, discharge consistent with BV  well rugated, no lesions, wet prep done  Rectal exam: deferred    ASSESSMENT/PLAN:     ICD-10-CM    1. Dysuria R30.0 UA with Microscopic   2. Vaginal odor N89.8 Wet  Prep     metroNIDAZOLE (FLAGYL) 500 MG tablet   3. Vaginal discharge N89.8 metroNIDAZOLE (FLAGYL) 500 MG tablet       Results for orders placed or performed in visit on 07/15/19   UA with Microscopic   Result Value Ref Range    Color Urine Yellow     Appearance Urine Clear     Glucose Urine Negative NEG^Negative mg/dL    Bilirubin Urine Negative NEG^Negative    Ketones Urine Negative NEG^Negative mg/dL    Specific Gravity Urine 1.015 1.003 - 1.035    pH Urine 6.5 5.0 - 7.0 pH    Protein Albumin Urine Negative NEG^Negative mg/dL    Urobilinogen Urine 0.2 0.2 - 1.0 EU/dL    Nitrite Urine Negative NEG^Negative    Blood Urine Negative NEG^Negative    Leukocyte Esterase Urine Negative NEG^Negative    Source Midstream Urine     WBC Urine 0 - 5 OTO5^0 - 5 /HPF    RBC Urine O - 2 OTO2^O - 2 /HPF   Wet  Prep   Result Value Ref Range    Specimen Description Vagina     Wet Prep No Trichomonas seen     Wet Prep Few  Clue cells seen   (A)     Wet Prep No " yeast seen     Wet Prep No WBC's seen          COUNSELING:  Advised no alcohol during treat with flagyl  Use a probiotic when taking antibiotics  Abstain from sexual intercourse while being treated for vaginal infection  Discussed cotton underwear, not sleeping in underwearing, changing out of sweaty clothes and wet swimsuits quickly   Return to clinic if symptoms persist or worsen    DERECK Kulkarni, CNM

## 2019-07-16 ASSESSMENT — ANXIETY QUESTIONNAIRES: GAD7 TOTAL SCORE: 0

## 2019-07-18 ENCOUNTER — E-VISIT (OUTPATIENT)
Dept: FAMILY MEDICINE | Facility: CLINIC | Age: 29
End: 2019-07-18
Payer: COMMERCIAL

## 2019-07-18 DIAGNOSIS — T36.95XA ANTIBIOTIC-INDUCED YEAST INFECTION: ICD-10-CM

## 2019-07-18 DIAGNOSIS — J01.00 ACUTE MAXILLARY SINUSITIS, RECURRENCE NOT SPECIFIED: ICD-10-CM

## 2019-07-18 DIAGNOSIS — B37.9 ANTIBIOTIC-INDUCED YEAST INFECTION: ICD-10-CM

## 2019-07-18 DIAGNOSIS — J40 BRONCHITIS: Primary | ICD-10-CM

## 2019-07-18 PROCEDURE — 99444 ZZC PHYSICIAN ONLINE EVALUATION & MANAGEMENT SERVICE: CPT | Performed by: FAMILY MEDICINE

## 2019-07-18 RX ORDER — PREDNISONE 20 MG/1
40 TABLET ORAL DAILY
Qty: 10 TABLET | Refills: 0 | Status: SHIPPED | OUTPATIENT
Start: 2019-07-18 | End: 2019-10-22

## 2019-07-18 RX ORDER — ALBUTEROL SULFATE 90 UG/1
1-2 AEROSOL, METERED RESPIRATORY (INHALATION) EVERY 4 HOURS PRN
Qty: 6.7 G | Refills: 3 | Status: SHIPPED | OUTPATIENT
Start: 2019-07-18

## 2019-07-18 RX ORDER — FLUCONAZOLE 150 MG/1
TABLET ORAL
Qty: 3 TABLET | Refills: 0 | Status: SHIPPED | OUTPATIENT
Start: 2019-07-18 | End: 2019-10-22

## 2019-07-18 NOTE — TELEPHONE ENCOUNTER
Sinus and Bronchitis infections x few days  No hx Allergies     A/P:  Rx Augmentin x 10 days  Add Prednisone taper and refilled Albuterol.  Rx Diflucan for Antibiotic yeast infection.  --push fluids, rest, and symptomatic treatment as needed:  Mucinex 600 mg 2 tabs bid  Increase humidity to 30-40% in bedroom at night - vaporizer  Saline nasal spray as needed  Benadryl 25mg 1/2 - 1 hour before bed time  Maintain 8 hr minimum of sleep at night  Robitussin for cough  --Will return to clinic as needed.  See Patient Instructions for details and follow-up instructions    --Dr. Montalvo

## 2019-09-21 ENCOUNTER — MYC REFILL (OUTPATIENT)
Dept: FAMILY MEDICINE | Facility: CLINIC | Age: 29
End: 2019-09-21

## 2019-09-21 DIAGNOSIS — L70.0 NODULOCYSTIC ACNE: ICD-10-CM

## 2019-09-21 DIAGNOSIS — Z51.81 ENCOUNTER FOR THERAPEUTIC DRUG MONITORING: ICD-10-CM

## 2019-09-21 DIAGNOSIS — L70.0 ACNE VULGARIS: ICD-10-CM

## 2019-09-23 DIAGNOSIS — L70.0 ACNE VULGARIS: Primary | ICD-10-CM

## 2019-09-23 RX ORDER — SPIRONOLACTONE 50 MG/1
50 TABLET, FILM COATED ORAL DAILY
Qty: 90 TABLET | Refills: 0 | OUTPATIENT
Start: 2019-09-23

## 2019-09-23 NOTE — TELEPHONE ENCOUNTER
She needs to have a K and Cr completed before further refills.   I have left an order in the chart.     Tika Jama M.D.

## 2019-09-23 NOTE — TELEPHONE ENCOUNTER
[employer]ociety message sent:    Samy Ramos-    The medication you have requested requires updated labs.     Dr. Jama has placed those orders, you can go to any [place] lab for this. Once the results are back, Dr. Jama can send the Spironolactone over to your pharmacy.    Let me know if you have any questions.    ISABELLA Bosch-BSN-PHN  [facility]  [phone]

## 2019-09-23 NOTE — TELEPHONE ENCOUNTER
Called and LM for patient. Informed patient I would send her a MXP4 message regarding her refill request.    ISABELLA Bosch-BSN-N  [facility]  [phone]

## 2019-09-23 NOTE — TELEPHONE ENCOUNTER
[facility] protocol failed- LOV 6/27/19    Diuretics (Including Combos) Protocol Failed9/21 8:25 AM   Normal serum creatinine on file in past 12 months    Normal serum potassium on file in past 12 months    Normal serum sodium on file in past 12 months     ISABELLA Bosch-BSN-N  [facility]  [phone]

## 2019-10-01 ENCOUNTER — TELEPHONE (OUTPATIENT)
Dept: FAMILY MEDICINE | Facility: CLINIC | Age: 29
End: 2019-10-01

## 2019-10-01 DIAGNOSIS — Z51.81 ENCOUNTER FOR THERAPEUTIC DRUG MONITORING: ICD-10-CM

## 2019-10-01 DIAGNOSIS — L70.0 NODULOCYSTIC ACNE: ICD-10-CM

## 2019-10-01 DIAGNOSIS — L70.0 ACNE VULGARIS: ICD-10-CM

## 2019-10-01 LAB
CREAT SERPL-MCNC: 0.72 MG/DL (ref 0.52–1.04)
GFR SERPL CREATININE-BSD FRML MDRD: >90 ML/MIN/{1.73_M2}
POTASSIUM SERPL-SCNC: 3.9 MMOL/L (ref 3.4–5.3)

## 2019-10-01 PROCEDURE — 84132 ASSAY OF SERUM POTASSIUM: CPT | Performed by: FAMILY MEDICINE

## 2019-10-01 PROCEDURE — 82565 ASSAY OF CREATININE: CPT | Performed by: FAMILY MEDICINE

## 2019-10-01 PROCEDURE — 36415 COLL VENOUS BLD VENIPUNCTURE: CPT | Performed by: FAMILY MEDICINE

## 2019-10-01 RX ORDER — SPIRONOLACTONE 50 MG/1
50 TABLET, FILM COATED ORAL DAILY
Qty: 90 TABLET | Refills: 2 | Status: SHIPPED | OUTPATIENT
Start: 2019-10-01

## 2019-10-01 NOTE — TELEPHONE ENCOUNTER
Patient notified of test results and providers message, patient has no further questions.    Pat LITTLERN BSN  [facility]  [phone]

## 2019-10-01 NOTE — TELEPHONE ENCOUNTER
----- Message from Tika Jama MD sent at 10/1/2019  3:44 PM CDT -----  Call patient :    Lab work looks good.    Faxed in refill prescription for her spironolactone.     Thank you,   Tika Jama M.D.

## 2019-10-01 NOTE — TELEPHONE ENCOUNTER
Left message on answering machine for patient to call back.      Pat LITTLERN BSN  [facility]  [phone]

## 2019-11-14 DIAGNOSIS — B00.9 HERPES SIMPLEX VIRUS INFECTION: ICD-10-CM

## 2019-11-14 NOTE — TELEPHONE ENCOUNTER
"Requested Prescriptions   Pending Prescriptions Disp Refills     valACYclovir (VALTREX) 1000 mg tablet    Last Written Prescription Date:  03/26/2019  Last Fill Quantity: 30 tablet,  # refills: 0   Last office visit: 3/27/2019 with prescribing provider:  Katia   Future Office Visit:     30 tablet 0     Sig: FOR ORAL OUTBREAK take 2 tablets by mouth twice daily for 1 day. FOR GENITAL OUTBREAK take 1 tablet for 5 days.       Antivirals for Herpes Protocol Passed - 11/14/2019  8:49 AM        Passed - Patient is age 12 or older        Passed - Recent (12 mo) or future (30 days) visit within the authorizing provider's specialty     Patient has had an office visit with the authorizing provider or a provider within the authorizing providers department within the previous 12 mos or has a future within next 30 days. See \"Patient Info\" tab in inbasket, or \"Choose Columns\" in Meds & Orders section of the refill encounter.              Passed - Medication is active on med list        Passed - Normal serum creatinine on file in past 12 months     Recent Labs   Lab Test 10/01/19  0946   CR 0.72                "

## 2019-11-18 RX ORDER — VALACYCLOVIR HYDROCHLORIDE 1 G/1
TABLET, FILM COATED ORAL
Qty: 30 TABLET | Refills: 0 | Status: SHIPPED | OUTPATIENT
Start: 2019-11-18 | End: 2020-06-10

## 2019-12-19 ENCOUNTER — E-VISIT (OUTPATIENT)
Dept: FAMILY MEDICINE | Facility: CLINIC | Age: 29
End: 2019-12-19
Payer: COMMERCIAL

## 2019-12-19 DIAGNOSIS — J01.00 ACUTE NON-RECURRENT MAXILLARY SINUSITIS: Primary | ICD-10-CM

## 2019-12-19 PROCEDURE — 99444 ZZC PHYSICIAN ONLINE EVALUATION & MANAGEMENT SERVICE: CPT | Performed by: FAMILY MEDICINE

## 2019-12-19 RX ORDER — IPRATROPIUM BROMIDE 42 UG/1
2 SPRAY, METERED NASAL 4 TIMES DAILY PRN
Qty: 1 BOX | Refills: 0 | Status: SHIPPED | OUTPATIENT
Start: 2019-12-19 | End: 2019-12-27

## 2019-12-19 RX ORDER — PREDNISONE 20 MG/1
40 TABLET ORAL DAILY
Qty: 10 TABLET | Refills: 0 | Status: SHIPPED | OUTPATIENT
Start: 2019-12-19 | End: 2019-12-24

## 2019-12-19 NOTE — PATIENT INSTRUCTIONS
Sinusitis (No Antibiotics)    The sinuses are air-filled spaces within the bones of the face. They connect to the inside of the nose. Sinusitis is an inflammation of the tissue that lines the sinuses. Sinusitis can occur during a cold. It can also happen due to allergies to pollens and other particles in the air. It can cause symptoms such as sinus congestion, headache, sore throat, facial swelling, and a feeling of fullness. It may also cause a low-grade fever. Your sinusitis does not include an infection with bacteria. Because of this, antibiotics are not used to treat this problem.  Home care    Drink plenty of water, hot tea, and other liquids. This may help thin nasal mucus. It also may help your sinuses drain fluids.    Heat may help soothe painful areas of your face. Use a towel soaked in hot water. Or,  the shower and direct the warm spray onto your face. Using a vaporizer along with a menthol rub at night may also help soothe symptoms.     An expectorant with guaifenesin may help thin nasal mucus and help your sinuses drain fluids.    You can use an over-the-counter decongestant, unless a similar medicine was prescribed to you. Nasal sprays work the fastest. Use one that contains phenylephrine or oxymetazoline. First blow your nose gently. Then use the spray. Do not use these medicines more often than directed on the label. If you do, your symptoms may get worse. You may also take pills that contain pseudoephedrine. Don t use products that combine multiple medicines. This is because side effects may be increased. Read all medicine labels. You can also ask the pharmacist for help. (People with high blood pressure should not use decongestants. They can raise blood pressure.)    Over-the-counter antihistamines may help if allergies contributed to your sinusitis.      Use acetaminophen or ibuprofen to control pain, unless another pain medicine was prescribed to you. If you have chronic liver or  kidney disease or ever had a stomach ulcer, talk with your healthcare provider before using these medicines. (Aspirin should never be taken by anyone under age 18 who is ill with a fever. It may cause severe liver damage.)    Use nasal rinses or irrigation as instructed by your healthcare provider.    Don't smoke. This can make symptoms worse.  Follow-up care  Follow up with your healthcare provider or our staff if you are NOT better in 1 week.  When to seek medical advice  Call your healthcare provider if any of these occur:    Green or yellow fluid draining from your nose or into your throat    Facial pain or headache that gets worse    Stiff neck    Unusual drowsiness or confusion    Swelling of your forehead or eyelids    Vision problems, such as blurred or double vision    Fever of 100.4 F (38 C) or higher, or as directed by your healthcare provider    Seizure    Breathing problems    Symptoms that don't go away in 10 days  Date Last Reviewed: 11/1/2017 2000-2018 [employer]. [address] [place] [address]. All rights reserved. This information is not intended as a substitute for professional medical care. Always follow your healthcare professional's instructions.          Allergic Rhinitis  Allergic rhinitis is an allergic reaction that affects the nose, and often the eyes. It s often known as nasal allergies. Nasal allergies are often due to things in the environment that are breathed in. Depending what you are sensitive to, nasal allergies may occur only during certain seasons. Or they may occur year round. Common indoor allergens include house dust mites, mold, cockroaches, and pet dander. Outdoor allergens include pollen from trees, grasses, and weeds.   Symptoms include a drippy, stuffy, and itchy nose. They also include sneezing and red and itchy eyes. You may feel tired more often. Severe allergies may also affect your breathing and trigger a condition called asthma.   Tests  can be done to see what allergens are affecting you. You may be referred to an allergy specialist for testing and further evaluation.  Home care  Your healthcare provider may prescribe medicines to help relieve allergy symptoms. These may include oral medicines, nasal sprays, or eye drops.  Ask your provider for advice on how to avoid substances that you are allergic to. Below are a few tips for each type of allergen.  Pet dander:    Do not have pets with fur and feathers.    If you can't avoid having a pet, keep it out of your bedroom and off upholstered furniture.  Pollen:    When pollen counts are high, keep windows of your car and home closed. If possible, use an air conditioner instead.    Wear a filter mask when mowing or doing yard work.  House dust mites:    Wash bedding every week in warm water and detergent and dry on a hot setting.    Cover the mattress, box spring, and pillows with allergy covers.     If possible, sleep in a room with no carpet, curtains, or upholstered furniture.  Cockroaches:    Store food in sealed containers.    Remove garbage from the home promptly.    Fix water leaks  Mold:    Keep humidity low by using a dehumidifier or air conditioner. Keep the dehumidifier and air conditioner clean and free of mold.    Clean moldy areas with bleach and water.  In general:    Vacuum once or twice a week. If possible, use a vacuum with a high-efficiency particulate air (HEPA) filter.    Do not smoke. Avoid cigarette smoke. Cigarette smoke is an irritant that can make symptoms worse.  Follow-up care  Follow up as advised by the healthcare provider or our staff. If you were referred to an allergy specialist, make this appointment promptly.  When to seek medical advice  Call your healthcare provider right away if the following occur:    Coughing or wheezing    Fever of 100.4 F (38 C) or higher, or as directed by your healthcare provider    Raised red bumps (hives)    Continuing symptoms, new  symptoms, or worsening symptoms  Call 911 if you have:    Trouble breathing    Severe swelling of the face or severe itching of the eyes or mouth  Date Last Reviewed: 3/1/2017    0151-5231 [employer]. [address] [place] [address], [place] [address]. All rights reserved. This information is not intended as a substitute for professional medical care. Always follow your healthcare professional's instructions.

## 2019-12-19 NOTE — TELEPHONE ENCOUNTER
Sinus congestion, most likely viral vs allergic rhinitis  Rx Ipratropium and Prednisone (if needed)  --Dr. Montalvo

## 2019-12-23 ENCOUNTER — E-VISIT (OUTPATIENT)
Dept: FAMILY MEDICINE | Facility: CLINIC | Age: 29
End: 2019-12-23
Payer: COMMERCIAL

## 2019-12-23 DIAGNOSIS — B37.31 VULVOVAGINAL CANDIDIASIS: Primary | ICD-10-CM

## 2019-12-23 DIAGNOSIS — R30.0 DIFFICULT OR PAINFUL URINATION: ICD-10-CM

## 2019-12-23 PROCEDURE — 99444 ZZC PHYSICIAN ONLINE EVALUATION & MANAGEMENT SERVICE: CPT | Performed by: PHYSICIAN ASSISTANT

## 2019-12-23 RX ORDER — SULFAMETHOXAZOLE/TRIMETHOPRIM 800-160 MG
1 TABLET ORAL 2 TIMES DAILY
Qty: 6 TABLET | Refills: 0 | Status: SHIPPED | OUTPATIENT
Start: 2019-12-23 | End: 2019-12-26

## 2019-12-23 RX ORDER — FLUCONAZOLE 150 MG/1
150 TABLET ORAL ONCE
Qty: 1 TABLET | Refills: 0 | Status: SHIPPED | OUTPATIENT
Start: 2019-12-23 | End: 2019-12-23

## 2019-12-24 NOTE — PATIENT INSTRUCTIONS
Thank you for choosing us for your care. Given your symptoms, I would like you to do a lab-only visit to determine what is causing them.  This will confirm a urinary tract infection.  I have also sent a prescription to treat a urinary tract infection, however please make sure to leave the urine sample at our lab BEFORE you start taking the antibiotic - this will help us to identify the bacteria causing your infection, and confirm we have chosen the correct antibiotic.  I will also send a prescription for fluconazole, which I would recommend you start if you develop a yeast infection with the antibiotics.     I have placed the orders.  Please schedule an appointment with the lab right here in [facility], or call [phone].  I will let you know when the results are back and next steps to take.    Urinary Tract Infections in Women    Urinary tract infections (UTIs) are most often caused by bacteria. These bacteria enter the urinary tract. The bacteria may come from outside the body. Or they may travel from the skin outside the rectum or vagina into the urethra. Female anatomy makes it easy for bacteria from the bowel to enter a woman s urinary tract, which is the most common source of UTI. This means women develop UTIs more often than men. Pain in or around the urinary tract is a common UTI symptom. But the only way to know for sure if you have a UTI for the healthcare provider to test your urine. The two tests that may be done are the urinalysis and urine culture.  Types of UTIs    Cystitis. A bladder infection (cystitis) is the most common UTI in women. You may have urgent or frequent urination. You may also have pain, burning when you urinate, and bloody urine.    Urethritis. This is an inflamed urethra, which is the tube that carries urine from the bladder to outside the body. You may have lower stomach or back pain. You may also have urgent or frequent urination.    Pyelonephritis. This is a kidney infection. If  not treated, it can be serious and damage your kidneys. In severe cases, you may need to stay in the hospital. You may have a fever and lower back pain.  Medicines to treat a UTI  Most UTIs are treated with antibiotics. These kill the bacteria. The length of time you need to take them depends on the type of infection. It may be as short as 3 days. If you have repeated UTIs, you may need a low-dose antibiotic for several months. Take antibiotics exactly as directed. Don t stop taking them until all of the medicine is gone. If you stop taking the antibiotic too soon, the infection may not go away. You may also develop a resistance to the antibiotic. This can make it much harder to treat.  Lifestyle changes to treat and prevent UTIs  The lifestyle changes below will help get rid of your UTI. They may also help prevent future UTIs.    Drink plenty of fluids. This includes water, juice, or other caffeine-free drinks. Fluids help flush bacteria out of your body.    Empty your bladder. Always empty your bladder when you feel the urge to urinate. And always urinate before going to sleep. Urine that stays in your bladder can lead to infection. Try to urinate before and after sex as well.    Practice good personal hygiene. Wipe yourself from front to back after using the toilet. This helps keep bacteria from getting into the urethra.    Use condoms during sex. These help prevent UTIs caused by sexually transmitted bacteria. Also don't use spermicides during sex. These can increase the risk for UTIs. Choose other forms of birth control instead. For women who tend to get UTIs after sex, a low-dose of a preventive antibiotic may be used. Be sure to discuss this option with your healthcare provider.    Follow up with your healthcare provider as directed. He or she may test to make sure the infection has cleared. If needed, more treatment may be started.  Date Last Reviewed: 1/1/2017 2000-2018 [employer]. [address]  [place] [address]. All rights reserved. This information is not intended as a substitute for professional medical care. Always follow your healthcare professional's instructions.

## 2019-12-26 DIAGNOSIS — J01.00 ACUTE NON-RECURRENT MAXILLARY SINUSITIS: ICD-10-CM

## 2019-12-26 NOTE — LETTER
December 27, 2019    Rachel Rhoades  [id number]  [facility] [address]    Dear Quentin Ramos cares about your health and your health plan.  I have reviewed your medical conditions, medication list and lab results, and am making recommendations based on this review to better manage your health.    You are in particular need of attention regarding:  -Asthma    I am recommending that you:     -Complete and return the attached ASTHMA CONTROL TEST.  If your total score is 19 or less or you have been to the ER or urgent care for your asthma, then please schedule an asthma followup appointment.      Please call us at the [employer] location:  [phone] or use [employer] to address the above recommendations.     Thank you for trusting [facility].  We appreciate the opportunity to serve you and look forward to supporting your healthcare in the future.    If you have (or plan to have) any of these tests done at a facility other than a [facility] or a [facility], please have the results sent to the [facility] location noted above.      Best Regards,    Suly Montalvo, DO

## 2019-12-26 NOTE — TELEPHONE ENCOUNTER
Requested Prescriptions   Pending Prescriptions Disp Refills     ipratropium (ATROVENT) 0.06 % nasal spray 1 Box 0     Sig: Spray 2 sprays into both nostrils 4 times daily as needed for rhinitis        Last Written Prescription Date:  12/19/19  Last Fill Quantity: 1 box,   # refills: 0  Last Office Visit: 12/23/19  Future Office visit:       Routing refill request to provider for review/approval because:  Drug not on the [facility] or [facility] refill protocol or controlled substance         There is no refill protocol information for this order

## 2019-12-27 RX ORDER — IPRATROPIUM BROMIDE 42 UG/1
2 SPRAY, METERED NASAL 4 TIMES DAILY PRN
Qty: 1 BOX | Refills: 3 | Status: SHIPPED | OUTPATIENT
Start: 2019-12-27 | End: 2020-01-13

## 2020-01-13 ENCOUNTER — E-VISIT (OUTPATIENT)
Dept: FAMILY MEDICINE | Facility: CLINIC | Age: 30
End: 2020-01-13
Payer: COMMERCIAL

## 2020-01-13 DIAGNOSIS — R11.2 NAUSEA AND VOMITING, INTRACTABILITY OF VOMITING NOT SPECIFIED, UNSPECIFIED VOMITING TYPE: Primary | ICD-10-CM

## 2020-01-13 DIAGNOSIS — A05.9 FOOD POISONING: ICD-10-CM

## 2020-01-13 PROCEDURE — 99423 OL DIG E/M SVC 21+ MIN: CPT | Performed by: FAMILY MEDICINE

## 2020-01-13 RX ORDER — ONDANSETRON 4 MG/1
4 TABLET, ORALLY DISINTEGRATING ORAL EVERY 6 HOURS PRN
Qty: 20 TABLET | Refills: 1 | Status: SHIPPED | OUTPATIENT
Start: 2020-01-13

## 2020-01-14 NOTE — PATIENT INSTRUCTIONS
Patient Education     Food Poisoning (Adult)  Food poisoning is illness that is passed along in food. It usually occurs from 1 to 24 hours after eating food that has spoiled. Food poisoning can occur when you eat food or drink that contains viruses, bacteria, parasites, or toxins. This includes food that has not been cooked or refrigerated properly.  Food poisoning can cause these symptoms:    Abdominal pain and cramping    Nausea    Vomiting    Diarrhea    Fever and chills    Fatigue  The symptoms usually last from 1 to 2 days.  Antibiotics are not effective for this illness, except for certain cases of bacterial food poisoning.  Home care  Follow all instructions given by your healthcare provider. Rest at home for the next 24 hours, or until you feel better. Avoid caffeine, tobacco, and alcohol. These can make diarrhea, cramping, and pain worse.  If taking medicines:    Over-the-counter diarrhea and nausea medicines are generally OK unless you have bleeding, fever, or severe abdominal pain.    You may be given medicine for nausea or vomiting to help keep down fluids. Take these medicines as prescribed.    You may use acetaminophen or NSAID medicine like ibuprofen or naproxen to reduce pain and fever. Don t use these if you have chronic liver or kidney disease, or ever had a stomach ulcer or gastrointestinal bleeding. Talk with your healthcare provider first. Don't use NSAID medicines if you are already taking one for another condition (like arthritis) or are on aspirin (such as for heart disease or after a stroke).  To prevent the spread of illness:    Remember that washing with soap and water or using alcohol-based  is the best way to prevent the spread of infection. Dry your hands with a single use towel.    Clean the toilet after each use.    Wash your hands before eating.    Wash your hands or use alcohol-based  before and after preparing food. Keep in mind that people with diarrhea or  vomiting should not prepare food for others.    Wash your hands after using cutting boards, counter-tops, and knives or other utensils that have been in contact with raw foods.    Wash and then peel fruits and vegetables.    Keep uncooked meats away from cooked and ready-to-eat foods.    Use a food thermometer when cooking. Cook poultry to at least 165 F (74 C). Cook ground meat (beef, veal, pork, lamb) to at least 160 F (71 C). Cook fresh beef, veal, lamb, and pork to at least 145 F (63 C).    Don t eat raw or undercooked eggs (poached or john side up), poultry, meat or unpasteurized milk and juices.    Don't eat foods requiring refrigeration. Don't eat foods that have not been refrigerated for long periods such as at buffets or picnics.    Don't eat seafood that is undercooked or with high rates of food toxins.  Food and drinks  The main goal while treating vomiting or diarrhea is to prevent dehydration. This is done by taking small amounts of liquids often.    Keep in mind that liquids are more important than food right now.    Drink only small amounts of liquids at a time.    Don t force yourself to eat, especially if you are having cramping, vomiting, or diarrhea. Don t eat large amounts at a time, even if you are hungry.    If you eat, avoid fatty, greasy, spicy, or fried foods.    Don t eat dairy foods or drink milk if you have diarrhea. These can make diarrhea worse.  The first 24 hours you can try:    Soft drinks without caffeine    Ginger ale    Water (plain or flavored)    Decaf tea or coffee    Clear broth, consommé, or bouillon    Gelatin, ice pops, or frozen fruit juice bars    Oral rehydration solutions, which are available over-the-counter. Sports drinks are not the best choice as they have too much sugar and not enough electrolytes. However, they may be used if you are not too dehydrated and are otherwise healthy.  The second 24 hours, if you are feeling better, you can add:    Hot cereal, plain  toast, bread, rolls, or crackers    Plain noodles, rice, mashed potatoes, chicken noodle soup, or rice soup    Unsweetened canned fruit (no pineapple)    Bananas  As you recover:    Limit fat intake to less than 15 grams per day. Don t eat margarine, butter, oils, mayonnaise, sauces, gravies, fried foods, peanut butter, meat, poultry, or fish.    Limit fiber. Don t eat raw or cooked vegetables, fresh fruits except bananas, and bran cereals.    Limit caffeine and chocolate.    Don t use spices or seasonings except salt.    Resume a normal diet over time, as you feel better and your symptoms improve.    If the symptoms come back, go back to a simple diet or clear liquids.  Follow-up care  Follow up with your healthcare provider, or as advised. If a stool sample was taken or cultures were done, call the healthcare provider for the results as instructed.  Call 911  Call 911 if you have any of these symptoms:    Trouble breathing    Chest pain    Confusion    Extreme drowsiness or trouble walking    Loss of consciousness    Rapid heart rate    Stiff neck    Seizure  When to seek medical advice  Call your healthcare provider right away if any of these occur:    Abdominal pain that gets worse    Constant lower right abdominal pain    Continued vomiting and inability to keep liquids down    Diarrhea more than 5 times a day    Blood in vomit or stool    Dark urine or no urine for 8 hours, dry mouth and tongue, tiredness, weakness, or dizziness    New rash    You don t get better in 2 to 3 days    Fever of 100.4 F (38 C) or higher, or as directed by your healthcare provider  Date Last Reviewed: 6/1/2018 2000-2019 [employer]. [address] [place] [address]. All rights reserved. This information is not intended as a substitute for professional medical care. Always follow your healthcare professional's instructions.           Patient Education     [place] Diet  Your healthcare provider may recommend a  "bland diet if you have an upset stomach. It consists of foods that are mild and easy to digest. It is better to eat small frequent meals rather than 3 large meals a day.    Beverages  OK: Fruit juices, non-caffeinated teas and coffee, non-carbonated de la cruz  Avoid: Carbonated beverage, caffeinated tea and coffee, all alcoholic beverages  Bread  OK: Refined white, wheat or rye bread, ina or soda crackers, Jen toast, plain rolls, bagels  Avoid: Whole-grain bread  Cereal  OK: Refined cereals: cooked or ready to eat  Avoid: Whole-grain cereals and granola, or those containing bran, seeds or nuts  Desserts  OK: Peanut butter and all others except those to \"avoid\"  Avoid: Chocolate, cocoa, coconut, popcorn, nuts, seeds, jam, marmalade  Fruits  OK: Canned, cooked, frozen or fresh fruits without seeds or tough skin  Avoid: Olives, skin and seeds of fruit, dried fruit  Meats  OK: All fresh or preserved meat, fish and fowl  Avoid: Any that are prepared with those spices to \"avoid\"  Cheese and eggs  OK: Eggs, cottage cheese, cream cheese, other cheeses  Avoid: All cheeses made with those spices to \"avoid\"  Potatoes and pasta  OK: Potato, rice, macaroni, noodles, spaghetti  Avoid: None  Soups  OK: All soups without heavy seasoning  Avoid: Soups made with those spices to \"avoid\"  Vegetables  OK: Canned, cooked, fresh or frozen mildly flavored vegetables without seeds, skins or coarse fiber  Avoid: Vegetables prepared with those spices to \"avoid\"; skin and seeds of vegetables and those with coarse fiber, broccoli, cabbage, cauliflower, cucumber, green peppers, and corn  Spices  OK: Salt, lemon and limejuice, vinegar, all extracts, vignesh, cinnamon, thyme, mace, allspice, paprika  Avoid: Chili powder, cloves, pepper, seed spices, garlic, gravy pickles, highly seasoned salad dressings  Date Last Reviewed: 5/1/2018 2000-2019 [employer]. [address] [place] [address]. All rights reserved. This " information is not intended as a substitute for professional medical care. Always follow your healthcare professional's instructions.

## 2020-01-14 NOTE — TELEPHONE ENCOUNTER
Provider E-Visit time total (minutes): >21 mins  See [employer]hart message for details on assessment and treatment plan.

## 2020-02-16 ENCOUNTER — HEALTH MAINTENANCE LETTER (OUTPATIENT)
Age: 30
End: 2020-02-16

## 2020-02-21 ENCOUNTER — E-VISIT (OUTPATIENT)
Dept: FAMILY MEDICINE | Facility: CLINIC | Age: 30
End: 2020-02-21
Payer: COMMERCIAL

## 2020-02-21 DIAGNOSIS — R30.0 DIFFICULT OR PAINFUL URINATION: ICD-10-CM

## 2020-02-21 DIAGNOSIS — B37.31 CANDIDAL VULVOVAGINITIS: ICD-10-CM

## 2020-02-21 DIAGNOSIS — B37.31 YEAST VAGINITIS: Primary | ICD-10-CM

## 2020-02-21 PROCEDURE — 99421 OL DIG E/M SVC 5-10 MIN: CPT | Performed by: FAMILY MEDICINE

## 2020-02-21 RX ORDER — FLUCONAZOLE 150 MG/1
TABLET ORAL
Qty: 5 TABLET | Refills: 0 | Status: SHIPPED | OUTPATIENT
Start: 2020-02-21 | End: 2020-03-13

## 2020-02-21 RX ORDER — SULFAMETHOXAZOLE/TRIMETHOPRIM 800-160 MG
1 TABLET ORAL 2 TIMES DAILY
Qty: 6 TABLET | Refills: 0 | OUTPATIENT
Start: 2020-02-21

## 2020-02-21 NOTE — PATIENT INSTRUCTIONS
Yeast Infection (Candida Vaginal Infection)    You have a Candida vaginal infection. This is also known as a yeast infection. It is most often caused by a type of yeast (fungus) called Candida. Candida are normally found in the vagina. But if they increase in number, this can lead to infection and cause symptoms.  Symptoms of a yeast infection can include:    Clumpy or thin, white discharge, which may look like cottage cheese    Itching or burning    Burning with urination  Certain factors can make a yeast infection more likely. These can include:    Taking certain medicines, such as antibiotics or birth control pills    Pregnancy    Diabetes    Weak immune system  A yeast infection is most often treated with antifungal medicine. This may be given as a vaginal cream or pills you take by mouth. Treatment may last for about 1 to 7 days. Women with severe or recurrent infections may need longer courses of treatment.  Home care    If you re prescribed medicine, be sure to use it as directed. Finish all of the medicine, even if your symptoms go away. Note: Don t try to treat yourself using over-the-counter products without talking to your provider first. He or she will let you know if this is a good option for you.    Ask your provider what steps you can take to help reduce your risk of having a yeast infection in the future.  Follow-up care  Follow up with your healthcare provider, or as directed.  When to seek medical advice  Call your healthcare provider right away if:    You have a fever of 100.4 F (38 C) or higher, or as directed by your provider.    Your symptoms worsen, or they don t go away within a few days of starting treatment.    You have new pain in the lower belly or pelvic region.    You have side effects that bother you or a reaction to the cream or pills you re prescribed.    You or any partners you have sex with have new symptoms, such as a rash, joint pain, or sores.  Date Last Reviewed:  10/1/2017    7310-3199 [employer]. [address] [place] [address], [place] [address]. All rights reserved. This information is not intended as a substitute for professional medical care. Always follow your healthcare professional's instructions.

## 2020-02-21 NOTE — TELEPHONE ENCOUNTER
"Requested Prescriptions   Pending Prescriptions Disp Refills     sulfamethoxazole-trimethoprim 800-160 MG PO tablet  Last Written Prescription Date:  12/23/2019 END: 12/26/2019  Last Fill Quantity: 6 tablet,  # refills: 0   Last office visit: 3/27/2019 with prescribing provider:  Katia   Future Office Visit:     6 tablet 0     Sig: Take 1 tablet by mouth 2 times daily       Oral Acne/Rosacea Medications Protocol Failed - 2/21/2020  8:59 AM        Failed - Confirmation of diagnosis is required     Please confirm diagnosis is acne or rosacea.     If NOT acne or rosacea; refer request to provider for further evaluation.    If diagnosis IS acne or rosacea, OK to refill BASED ON PREVIOUS REFILL CLINICAL NOTE RECOMMENDATION.          Failed - Medication is active on med list        Passed - Patient is 12 years of age or older        Passed - Recent (12 mo) or future (30 days) visit within the authorizing provider's specialty     Patient has had an office visit with the authorizing provider or a provider within the authorizing providers department within the previous 12 mos or has a future within next 30 days. See \"Patient Info\" tab in inbasket, or \"Choose Columns\" in Meds & Orders section of the refill encounter.              Passed - No active pregnancy on record        Passed - No positive prenancy test is past 12 months           "

## 2020-02-24 NOTE — TELEPHONE ENCOUNTER
Patient completed e-visit with PCP in a separate encounter. No further follow up needed in this encounter.

## 2020-03-13 ENCOUNTER — E-VISIT (OUTPATIENT)
Dept: FAMILY MEDICINE | Facility: CLINIC | Age: 30
End: 2020-03-13
Payer: COMMERCIAL

## 2020-03-13 DIAGNOSIS — N30.00 ACUTE CYSTITIS WITHOUT HEMATURIA: Primary | ICD-10-CM

## 2020-03-13 DIAGNOSIS — B37.31 YEAST VAGINITIS: ICD-10-CM

## 2020-03-13 PROCEDURE — 99422 OL DIG E/M SVC 11-20 MIN: CPT | Performed by: FAMILY MEDICINE

## 2020-03-13 RX ORDER — FLUCONAZOLE 150 MG/1
TABLET ORAL
Qty: 4 TABLET | Refills: 0 | Status: SHIPPED | OUTPATIENT
Start: 2020-03-13

## 2020-03-13 RX ORDER — NITROFURANTOIN 25; 75 MG/1; MG/1
100 CAPSULE ORAL 2 TIMES DAILY
Qty: 10 CAPSULE | Refills: 0 | Status: SHIPPED | OUTPATIENT
Start: 2020-03-13 | End: 2020-03-18

## 2020-03-23 ENCOUNTER — E-VISIT (OUTPATIENT)
Dept: FAMILY MEDICINE | Facility: CLINIC | Age: 30
End: 2020-03-23
Payer: COMMERCIAL

## 2020-03-23 DIAGNOSIS — J06.9 VIRAL UPPER RESPIRATORY TRACT INFECTION: Primary | ICD-10-CM

## 2020-03-23 PROCEDURE — 99207 ZZC NON-BILLABLE SERV PER CHARTING: CPT | Performed by: FAMILY MEDICINE

## 2020-03-24 ENCOUNTER — TELEPHONE (OUTPATIENT)
Dept: FAMILY MEDICINE | Facility: CLINIC | Age: 30
End: 2020-03-24

## 2020-03-24 NOTE — TELEPHONE ENCOUNTER
Could we call this patient?  She started an E-visit yesterday about COVID-19 but I encouraged her to start on [facility] visit but it looks like she has not done that yet.  Due to the symptoms that she described, either a telephone visit or [facility] visit would be more appropriate vs E-visit.    --Dr. Montalvo

## 2020-03-24 NOTE — TELEPHONE ENCOUNTER
Patient Contact    Attempt # 1 (2nd attempt on Day 1)    Sent [employer] message encouraging patient to start oncare visit.     If no response by 3/25, try calling again.

## 2020-03-24 NOTE — TELEPHONE ENCOUNTER
Patient Contact    Attempt # 1    Was call answered?  No.  Left message on voicemail with information to call me back.    On call back:    -Please advise pt to start [facility] visit.    Monitor to see if pt has before calling

## 2020-03-25 ENCOUNTER — VIRTUAL VISIT (OUTPATIENT)
Dept: FAMILY MEDICINE | Facility: OTHER | Age: 30
End: 2020-03-25

## 2020-03-25 NOTE — PROGRESS NOTES
"Date: 2020 07:35:07  Clinician: Letha Avilez  Clinician NPI: [id number]  Patient: Rachel Rhoades  Patient : 1990  Patient Address: [address] [place], [place] [address]  Patient Phone: [phone]  Visit Protocol: URI  Patient Summary:  Rachel is a 29 year old ( : 1990 ) female who initiated a Visit for COVID-19 (Coronavirus) evaluation and screening. When asked the question \"Please sign me up to receive news, health information and promotions from [employer].\", Rachel responded \"No\".    Rachel states her symptoms started 1-2 days ago.   Her symptoms consist of malaise, a headache, and a sore throat. She is experiencing mild difficulty breathing with activities but can speak normally in full sentences. Rachel also feels feverish but was unable to measure her temperature.   Symptom details     Sore throat: Rachel reports having mild throat pain (1-3 on a 10 point pain scale), does not have exudate on her tonsils, and can swallow liquids. The lymph nodes in her neck are not enlarged. A rash has not appeared on the skin since the sore throat started.     Headache: She states the headache is mild (1-3 on a 10 point pain scale).      Rachel denies having enlarged lymph nodes, chills, ear pain, rhinitis, teeth pain, facial pain or pressure, myalgias, wheezing, cough, and nasal congestion. She also denies having a sinus infection within the past year, taking antibiotic medication for the symptoms, and having recent facial or sinus surgery in the past 60 days.   Precipitating events  Within the past week, Rachel has not been exposed to someone with strep throat.   Pertinent COVID-19 (Coronavirus) information  Rachel has not traveled internationally or to the areas where COVID-19 (Coronavirus) is widespread, including cruise ship travel in the last 14 days before the start of her symptoms.   Rachel has not had a close contact with a laboratory-confirmed COVID-19 patient within 14 days of symptom onset. She " also has not had a close contact with a suspected COVID-19 patient within 14 days of symptom onset.   Rachel is not a healthcare worker or a  and does not work in a healthcare facility. She is not a family member of a healthcare worker and does not live with someone who is a healthcare worker.   Pertinent medical history  Rachel typically gets a yeast infection when she takes antibiotics. She has used fluconazole (Diflucan) to treat previous yeast infections. 1 dose of fluconazole (Diflucan) has typically been sufficient for symptoms to resolve in the past.   Rachel does not need a return to work/school note.   Weight: 120 lbs   Rachel does not smoke or use smokeless tobacco.   She denies pregnancy and denies breastfeeding. She has menstruated in the past month.   Weight: 120 lbs    MEDICATIONS: Proventil HFA inhalation, ALLERGIES: NKDA  Clinician Response:  Dear Rachel,   Based on the information you have provided, you do have symptoms that are consistent with Coronavirus (COVID-19).  The coronavirus causes mild to severe respiratory illness with the most common symptoms including fever, cough and difficulty breathing. Unfortunately, many viruses cause similar symptoms and it can be difficult to distinguish between viruses, especially in mild cases, so we are presuming that anyone with cough or fever has coronavirus at this time.  Coronavirus/COVID-19 has reached the point of community spread in [place], meaning that we are finding the virus in people with no known exposure risk for frandy the virus. Given the increasing commonness of coronavirus in the community we are no longer testing patients who are not critically ill.  If you are a health care worker, you should refer to your employee health office for instructions about testing and returning to work.  For everyone else who has cough or fever, you should assume you are infected with coronavirus. Since you will not be tested but have symptoms  that may be consistent with coronavirus, the CDC recommends you stay in self-isolation until these three things have happened:    You have had no fever for at least 72 hours (that is three full days of no fever without the use of medicine that reduces fevers)    AND   Other symptoms have improved (for example, when your cough or shortness of breath have improved)   AND   At least 7 days have passed since your symptoms first appeared.   How to Isolate:   Isolate yourself at home.  Do Not allow any visitors  Do Not go to work or school  Do Not go to [employer],  centers, shopping, or other public places.  Do Not shake hands.  Avoid close contact with others (hugging, kissing).   Protect Others:   Cover Your Mouth and Nose with a mask, disposable tissue or wash cloth to avoid spreading germs to others.  Wash your hands and face frequently with soap and water.   We know it can be scary to hear that you might have COVID-19. Our team can help track your symptoms and make sure you are doing ok over the next two weeks using a program called [employer] to keep in touch. When you receive an email from [employer] please consider enrolling in our monitoring program. There is no cost to you for monitoring. Here is a URL where you can learn more: [email or web address]  Managing Symptoms:   At this time, we primarily recommend Tylenol (Acetaminophen) for fever or pain. If you have liver or kidney problems, contact your primary care provider for instructions on use of tylenol. Adults can take 650 mg (two 325 mg pills) by mouth every 4-6 hours as needed OR 1,000 mg (two 500 mg pills) every 8 hours as needed. MAXIMUM DAILY DOSE: 3,000mg. For children, refer to dosing on bottle based on age or weight.   If you develop significant shortness of breath that prevents you from doing normal activities, please call 911 or proceed to the nearest emergency room and alert them immediately that you have been in self-isolation  for possible coronavirus.  For more information about COVID19 and options for caring for yourself at home, please visit the CDC website at https://www.cdc.gov/coronavirus/2019-ncov/about/steps-when-sick.htmlFor more options for care at [facility], please visit our website at [email or web address]    Diagnosis: Cough  Diagnosis ICD: R05

## 2020-03-26 ENCOUNTER — MYC MEDICAL ADVICE (OUTPATIENT)
Dept: FAMILY MEDICINE | Facility: CLINIC | Age: 30
End: 2020-03-26

## 2020-03-26 NOTE — TELEPHONE ENCOUNTER
Patient called back,  letter was e-mailed to [email or web address] as requested by patient. Directions for follow up were reviewed with patient.    Mindy Orozco LPN

## 2020-03-26 NOTE — TELEPHONE ENCOUNTER
Patient Contact    Attempt # 1    Was call answered?  No.  Left message on voicemail with information to call me back.    On call back:    -Please relay provider message

## 2020-03-26 NOTE — TELEPHONE ENCOUNTER
Can pt get a note for off work?    Also I can let her know to follow up with:     Our team can help track your symptoms and make sure you are doing ok over the next two weeks using a program called [employer] to keep in touch. When you receive an email from [employer], please consider enrolling in our monitoring program. There is no cost to you for monitoring. Here is a URL where you can learn more: [email or web address]

## 2020-03-26 NOTE — TELEPHONE ENCOUNTER
Can we call her instead of [email or web address]...  We need to tell her that she cannot be going to work when she is symptomatic like this.  And that she needs to stay in touch with us about her symptoms.  The [employer] is suppose to help with this close follow-up as well--she needs to agree and sign up for this, I highly recommend this too.  [employer] is suppose to be giving work excuse letters; she cannot be going to work.  But I can make one for her.    I have the letter signed in my outbox  --Suly

## 2020-03-26 NOTE — LETTER
March 26, 2020      Rachel Rhoades  [address] [address]        To Whom It May Concern:    Please excuse Rachel Rhoades from work due to an infectious respiratory illlness.  She needs to be excused from work from 3/23/2020 until 4/6/2020 due to this illness.    According to guidelines from the CDC, Rachel must stay in self-isolation until these three things have happened:      She has had no fever for at least 72 hours (that is three full days of no fever without the use of medicine that reduces fevers)      AND   Other symptoms have improved (for example, when her cough or shortness of breath have improved)     AND   At least 7 days have passed since her symptoms first appeared (which was approximately 3/23/2020).      Sincerely,        Suly Montalvo, DO

## 2020-06-10 ENCOUNTER — TELEPHONE (OUTPATIENT)
Dept: FAMILY MEDICINE | Facility: CLINIC | Age: 30
End: 2020-06-10

## 2020-06-10 DIAGNOSIS — B00.9 HERPES SIMPLEX VIRUS INFECTION: ICD-10-CM

## 2020-06-10 RX ORDER — VALACYCLOVIR HYDROCHLORIDE 1 G/1
TABLET, FILM COATED ORAL
Qty: 30 TABLET | Refills: 9 | Status: CANCELLED | OUTPATIENT
Start: 2020-06-10

## 2020-06-10 RX ORDER — VALACYCLOVIR HYDROCHLORIDE 1 G/1
TABLET, FILM COATED ORAL
Qty: 30 TABLET | Refills: 9 | Status: SHIPPED | OUTPATIENT
Start: 2020-06-10

## 2020-06-10 NOTE — TELEPHONE ENCOUNTER
Prescription Clarification    Reason for Call:  Other call back    Detailed comments: The pharmacy called and stated that they need clarifications on the directions that were sent for he prescription valACYclovir (VALTREX) 1000 mg tablet.  The directions state FOR ORAL OUTBREAK take 2 tablets by mouth twice daily for 1 day. FOR GENITAL OUTBREAK take 1 tablet for 5 days.     They need more specific directions for the genital outbreak.   They would like a call back or a new prescription sent with the updated directions.     Phone Number Patient can be reached at: Other phone number:  [phone]    Best Time: Any    Can we leave a detailed message on this number? Not Applicable    Call taken on 6/10/2020 at 1:11 PM by Berkley Astorga

## 2020-11-11 ENCOUNTER — MYC MEDICAL ADVICE (OUTPATIENT)
Dept: FAMILY MEDICINE | Facility: CLINIC | Age: 30
End: 2020-11-11

## 2020-11-11 DIAGNOSIS — N91.2 AMENORRHEA: Primary | ICD-10-CM

## 2020-11-29 ENCOUNTER — HEALTH MAINTENANCE LETTER (OUTPATIENT)
Age: 30
End: 2020-11-29

## 2021-04-10 ENCOUNTER — HEALTH MAINTENANCE LETTER (OUTPATIENT)
Age: 31
End: 2021-04-10

## 2021-09-19 ENCOUNTER — HEALTH MAINTENANCE LETTER (OUTPATIENT)
Age: 31
End: 2021-09-19

## 2022-05-01 ENCOUNTER — HEALTH MAINTENANCE LETTER (OUTPATIENT)
Age: 32
End: 2022-05-01

## 2022-11-21 ENCOUNTER — HEALTH MAINTENANCE LETTER (OUTPATIENT)
Age: 32
End: 2022-11-21

## 2023-06-02 ENCOUNTER — HEALTH MAINTENANCE LETTER (OUTPATIENT)
Age: 33
End: 2023-06-02